# Patient Record
Sex: FEMALE | Race: WHITE | NOT HISPANIC OR LATINO | Employment: UNEMPLOYED | URBAN - METROPOLITAN AREA
[De-identification: names, ages, dates, MRNs, and addresses within clinical notes are randomized per-mention and may not be internally consistent; named-entity substitution may affect disease eponyms.]

---

## 2017-07-10 NOTE — PROGRESS NOTES
HPI     NEXPLANON INSERTION PROCEDURE    Erma Gregory is a 36 year old  who presents for Nexplanon insertion. Patient's last menstrual period was 2017 (exact date).  The patient is currently using nexplanon  for contraception.     Tests:     Discussed risks of bleeding and infection with placement and the insertion procedure. Also discussed the possibility of irregular bleeding for 3-6 months and then often cessation of menses but possibility of continued abnormal bleeding. Small risk of migration of the Nexplanon or difficulty removing the Nexplanon. We also discussed possible side effects of weight gain, skin or hair changes, alterations in mood and increase in headaches.  Lasts for 3 years at which time she could have this one removed and another replaced. All questions answered and consent form signed.   Preprocedure medications: 1% plain lidocaine, 1-2 ml  Nexplanon Lot # [id number]      Exp date:3/18   NDC 0639-9433-65    All equipment required was ready and available.  Patients allergies were confirmed.  The patient was placed in the supine position with her left (non-dominant) arm flexed at the elbow, externally rotated, and placed with her wrist parallel to her ear.  The removal site was identified 6-8 cm above the elbow crease at the inner aspect overlying the bicepital groove.  The removal and reinsertion site was marked with a sterile marker. The direction of insertion was also indicated with a regla 6-8 cm proximal in the bicepital groove.  The  area was cleaned with betadine swabs and anesthetized with 3 cc of 1% lidocaine without epinephrine.  A small skin incision made using a #11 blade.  The Nexplanon was gently manipulated until the tip was at the incision. The becki tip was grasped with a  Joan clamp and was gently removed from the incision.  Both becki tips were inspected following removal and found to be completely intact.    The Nexplanon was removed from its blister.  The  needle shield was removed.  Counter-traction was applied to the skin at the marked needle insertion site.  The tip of the needle was inserted at the site, beveled side up, at a slight angle.  The applicator was then lowered to a horizontal position.  The needle was inserted to its full length, keeping the needle parallel to the surface of the skin and the skin tented.   The cannula was retracted against the obturator.  The 4 cm becki was palpated under the skin.  The patient also palpated the becki.  A pressure bandage was applied with sterile gauze. The patient was instructed to remove the bangage in several hours and replace with a band-aid.      The user card was filled out and given to the patient to keep.    PLAN:   The patient was asked to contact the clinic for any fever/chills/severe pelvic or abdominal pain or heavy bleeding.     FOLLOW-UP:  She was asked to follow up for any problems.   Return to clinic for pap

## 2017-07-11 ENCOUNTER — OFFICE VISIT (OUTPATIENT)
Dept: OBGYN | Facility: OTHER | Age: 37
End: 2017-07-11
Payer: COMMERCIAL

## 2017-07-11 VITALS
HEART RATE: 64 BPM | SYSTOLIC BLOOD PRESSURE: 104 MMHG | WEIGHT: 134.7 LBS | BODY MASS INDEX: 23 KG/M2 | RESPIRATION RATE: 16 BRPM | TEMPERATURE: 98.6 F | HEIGHT: 64 IN | DIASTOLIC BLOOD PRESSURE: 58 MMHG

## 2017-07-11 DIAGNOSIS — Z30.017 NEXPLANON INSERTION: Primary | ICD-10-CM

## 2017-07-11 DIAGNOSIS — Z30.46 NEXPLANON REMOVAL: ICD-10-CM

## 2017-07-11 PROCEDURE — 11983 REMOVE/INSERT DRUG IMPLANT: CPT | Performed by: ADVANCED PRACTICE MIDWIFE

## 2017-07-11 ASSESSMENT — PAIN SCALES - GENERAL: PAINLEVEL: NO PAIN (0)

## 2017-07-11 NOTE — MR AVS SNAPSHOT
"              After Visit Summary   2017    Erma Gregory    MRN: [id number]           Patient Information     Date Of Birth          1980        Visit Information        Provider Department      2017 3:00 PM Lashonda Rico APRN CNM [facility]        Today's Diagnoses     Nexplanon insertion    -  1    Nexplanon removal           Follow-ups after your visit        Who to contact     If you have questions or need follow up information about today's clinic visit or your schedule please contact [facility] directly at [phone].  Normal or non-critical lab and imaging results will be communicated to you by kajeethart, letter or phone within 4 business days after the clinic has received the results. If you do not hear from us within 7 days, please contact the clinic through kajeethart or phone. If you have a critical or abnormal lab result, we will notify you by phone as soon as possible.  Submit refill requests through Scope 5 or call your pharmacy and they will forward the refill request to us. Please allow 3 business days for your refill to be completed.          Additional Information About Your Visit        MyChart Information     Scope 5 lets you send messages to your doctor, view your test results, renew your prescriptions, schedule appointments and more. To sign up, go to [email or web address] . Click on \"Log in\" on the left side of the screen, which will take you to the Welcome page. Then click on \"Sign up Now\" on the right side of the page.     You will be asked to enter the access code listed below, as well as some personal information. Please follow the directions to create your username and password.     Your access code is: [email or web address]  Expires: 10/9/2017  3:38 PM     Your access code will  in 90 days. If you need help or a new code, please call your [facility] or [phone].        Care EveryWhere ID     This is your Care EveryWhere ID. This " "could be used by other organizations to access your [place] medical records  [id number]        Your Vitals Were     Pulse Temperature Respirations Height Last Period BMI (Body Mass Index)    64 98.6  F (37  C) (Oral) 16 5' 3.88\" (1.623 m) 07/08/2017 (Exact Date) 23.21 kg/m2       Blood Pressure from Last 3 Encounters:   07/11/17 104/58   10/27/16 110/62   08/03/15 104/70    Weight from Last 3 Encounters:   07/11/17 134 lb 11.2 oz (61.1 kg)   10/27/16 151 lb (68.5 kg)   08/03/15 158 lb 11.2 oz (72 kg)              We Performed the Following     ETONOGESTREL IMPLANT SYSTEM     REMOVAL AND REINSERTION NEXPLANON          Today's Medication Changes          These changes are accurate as of: 7/11/17  3:38 PM.  If you have any questions, ask your nurse or doctor.               Start taking these medicines.        Dose/Directions    etonogestrel 68 MG Impl   Commonly known as:  IMPLANON/NEXPLANON   Used for:  Nexplanon insertion, Nexplanon removal   Started by:  Lashonda Rico, FLORES CNM        Dose:  1 each   1 each (68 mg) by Subdermal route once for 1 dose   Quantity:  1 each   Refills:  0            Where to get your medicines      Some of these will need a paper prescription and others can be bought over the counter.  Ask your nurse if you have questions.     You don't need a prescription for these medications     etonogestrel 68 MG Impl                Primary Care Provider Office Phone # Fax #    Penny Ana Riddle [phone]       [facility]  [address] [address]        Equal Access to Services     ROBERTO DE JESUS : Beto Conner, wacathyda luqzuleyma, qaybta kaalwen lopes. So [facility] [phone].    ATENCIÓN: Si habla español, tiene a perdomo disposición servicios gratuitos de asistencia lingüística. Llame al [phone].    We comply with applicable federal civil rights laws and [place] laws. We do not discriminate " on the basis of race, color, national origin, age, disability sex, sexual orientation or gender identity.            Thank you!     Thank you for choosing [facility]  for your care. Our goal is always to provide you with excellent care. Hearing back from our patients is one way we can continue to improve our services. Please take a few minutes to complete the written survey that you may receive in the mail after your visit with us. Thank you!             Your Updated Medication List - Protect others around you: Learn how to safely use, store and throw away your medicines at www.disposemymeds.org.          This list is accurate as of: 7/11/17  3:38 PM.  Always use your most recent med list.                   Brand Name Dispense Instructions for use Diagnosis    etonogestrel 68 MG Impl    IMPLANON/NEXPLANON    1 each    1 each (68 mg) by Subdermal route once for 1 dose    Nexplanon insertion, Nexplanon removal       SUBOXONE 12-3 MG Film   Generic drug:  Buprenorphine HCl-Naloxone HCl

## 2017-07-11 NOTE — NURSING NOTE
"Chief Complaint   Patient presents with     Contraception     Panel Management     pap, tobacco cessation, DAP, PHQ9/KAELYN       Initial /58 (BP Location: Right arm, Patient Position: Chair, Cuff Size: Adult Regular)  Pulse 64  Temp 98.6  F (37  C) (Oral)  Resp 16  Ht 5' 3.88\" (1.623 m)  Wt 134 lb 11.2 oz (61.1 kg)  LMP 07/08/2017 (Exact Date)  BMI 23.21 kg/m2 Estimated body mass index is 23.21 kg/(m^2) as calculated from the following:    Height as of this encounter: 5' 3.88\" (1.623 m).    Weight as of this encounter: 134 lb 11.2 oz (61.1 kg).  Medication Reconciliation: complete    "

## 2017-07-11 NOTE — LETTER
My Depression Action Plan  Name: Erma Gregory   Date of Birth 1980  Date: 7/10/2017    My doctor: Penny Riddle   My clinic: [address]  [facility] [id number] [phone]          GREEN    ZONE   Good Control    What it looks like:     Things are going generally well. You have normal up s and down s. You may even feel depressed from time to time, but bad moods usually last less than a day.   What you need to do:  1. Continue to care for yourself (see self care plan)  2. Check your depression survival kit and update it as needed  3. Follow your physician s recommendations including any medication.  4. Do not stop taking medication unless you consult with your physician first.           YELLOW         ZONE Getting Worse    What it looks like:     Depression is starting to interfere with your life.     It may be hard to get out of bed; you may be starting to isolate yourself from others.    Symptoms of depression are starting to last most all day and this has happened for several days.     You may have suicidal thoughts but they are not constant.   What you need to do:     1. Call your care team, your response to treatment will improve if you keep your care team informed of your progress. Yellow periods are signs an adjustment may need to be made.     2. Continue your self-care, even if you have to fake it!    3. Talk to someone in your support network    4. Open up your depression survival kit           RED    ZONE Medical Alert - Get Help    What it looks like:     Depression is seriously interfering with your life.     You may experience these or other symptoms: You can t get out of bed most days, can t work or engage in other necessary activities, you have trouble taking care of basic hygiene, or basic responsibilities, thoughts of suicide or death that will not go away, self-injurious behavior.     What you need to do:  1. Call your care team and  request a same-day appointment. If they are not available (weekends or after hours) call your local crisis line, emergency room or 911.      Electronically signed by: Bárbara Rodriguez, July 10, 2017    Depression Self Care Plan / Survival Kit    Self-Care for Depression  Here s the deal. Your body and mind are really not as separate as most people think.  What you do and think affects how you feel and how you feel influences what you do and think. This means if you do things that people who feel good do, it will help you feel better.  Sometimes this is all it takes.  There is also a place for medication and therapy depending on how severe your depression is, so be sure to consult with your medical provider and/ or Behavioral Health Consultant if your symptoms are worsening or not improving.     In order to better manage my stress, I will:    Exercise  Get some form of exercise, every day. This will help reduce pain and release endorphins, the  feel good  chemicals in your brain. This is almost as good as taking antidepressants!  This is not the same as joining a gym and then never going! (they count on that by the way ) It can be as simple as just going for a walk or doing some gardening, anything that will get you moving.      Hygiene   Maintain good hygiene (Get out of bed in the morning, Make your bed, Brush your teeth, Take a shower, and Get dressed like you were going to work, even if you are unemployed).  If your clothes don't fit try to get ones that do.    Diet  I will strive to eat foods that are good for me, drink plenty of water, and avoid excessive sugar, caffeine, alcohol, and other mood-altering substances.  Some foods that are helpful in depression are: complex carbohydrates, B vitamins, flaxseed, fish or fish oil, fresh fruits and vegetables.    Psychotherapy  I agree to participate in Individual Therapy (if recommended).    Medication  If prescribed medications, I agree to take them.  Missing doses  can result in serious side effects.  I understand that drinking alcohol, or other illicit drug use, may cause potential side effects.  I will not stop my medication abruptly without first discussing it with my provider.    Staying Connected With Others  I will stay in touch with my friends, family members, and my primary care provider/team.    Use your imagination  Be creative.  We all have a creative side; it doesn t matter if it s oil painting, sand castles, or mud pies! This will also kick up the endorphins.    Witness Beauty  (AKA stop and smell the roses) Take a look outside, even in mid-winter. Notice colors, textures. Watch the squirrels and birds.     Service to others  Be of service to others.  There is always someone else in need.  By helping others we can  get out of ourselves  and remember the really important things.  This also provides opportunities for practicing all the other parts of the program.    Humor  Laugh and be silly!  Adjust your TV habits for less news and crime-drama and more comedy.    Control your stress  Try breathing deep, massage therapy, biofeedback, and meditation. Find time to relax each day.     My support system    Clinic Contact:  Phone number:    Contact 1:  Phone number:    Contact 2:  Phone number:    M[employer]onism/:  Phone number:    Therapist:  Phone number:    Local crisis center:    Phone number:    Other community support:  Phone number:

## 2017-12-15 ENCOUNTER — TELEPHONE (OUTPATIENT)
Dept: OBGYN | Facility: OTHER | Age: 37
End: 2017-12-15

## 2017-12-15 NOTE — TELEPHONE ENCOUNTER
Panel Management Review    Date of last visit with a [place] provider: Lashonda Jimenez APRN MEGHANM on 7/11/2017.  Date of next visit with a [place] provider: None.    Health Maintenance List    Health Maintenance   Topic Date Due     DEPRESSION ACTION PLAN Q1 YR  08/03/2016     TOBACCO CESSATION COUNSELING Q1 YR  01/21/2017     PAP SCREENING Q3 YR (SYSTEM ASSIGNED)  02/05/2017     PHQ-9 Q6 MONTHS  04/27/2017     INFLUENZA VACCINE (SYSTEM ASSIGNED)  09/01/2017     TETANUS IMMUNIZATION (SYSTEM ASSIGNED)  02/05/2024       Composite cancer screening  Chart review shows that this patient is due/due soon for the following Pap Smear  Lab Results   Component Value Date    PAP NIL 02/05/2014     No past surgical history on file.    Is hysterectomy listed in surgical history? No   Is mastectomy listed in surgical history? No     Summary:    Patient is due/failing the following:   Pap Smear and Physical    Action needed: Patient needs office visit for Physical with Pap smear.    Type of outreach:  Phone, left message for patient to call back.       Staff Signature:  Penelope Mendoza CMA

## 2018-03-03 ENCOUNTER — TELEPHONE (OUTPATIENT)
Dept: FAMILY MEDICINE | Facility: OTHER | Age: 38
End: 2018-03-03

## 2019-03-05 ENCOUNTER — COMMUNICATION - HEALTHEAST (OUTPATIENT)
Dept: SCHEDULING | Facility: CLINIC | Age: 39
End: 2019-03-05

## 2019-05-17 ENCOUNTER — HOSPITAL ENCOUNTER (INPATIENT)
Facility: CLINIC | Age: 39
LOS: 11 days | Discharge: HOME | DRG: 885 | End: 2019-05-28
Attending: EMERGENCY MEDICINE | Admitting: PSYCHIATRY & NEUROLOGY
Payer: COMMERCIAL

## 2019-05-17 DIAGNOSIS — F29 PSYCHOSIS, UNSPECIFIED PSYCHOSIS TYPE (H): ICD-10-CM

## 2019-05-17 DIAGNOSIS — F31.64 SEVERE MIXED BIPOLAR I DISORDER WITH PSYCHOTIC FEATURES (H): ICD-10-CM

## 2019-05-17 PROCEDURE — 99285 EMERGENCY DEPT VISIT HI MDM: CPT | Mod: Z6 | Performed by: EMERGENCY MEDICINE

## 2019-05-17 PROCEDURE — 99285 EMERGENCY DEPT VISIT HI MDM: CPT | Mod: 25 | Performed by: EMERGENCY MEDICINE

## 2019-05-17 PROCEDURE — 90791 PSYCH DIAGNOSTIC EVALUATION: CPT

## 2019-05-17 PROCEDURE — 12400001 ZZH R&B MH UMMC

## 2019-05-17 RX ORDER — ACETAMINOPHEN 325 MG/1
650 TABLET ORAL EVERY 4 HOURS PRN
Status: DISCONTINUED | OUTPATIENT
Start: 2019-05-17 | End: 2019-05-28 | Stop reason: HOSPADM

## 2019-05-17 RX ORDER — OLANZAPINE 10 MG/1
10 TABLET ORAL
Status: DISCONTINUED | OUTPATIENT
Start: 2019-05-17 | End: 2019-05-28 | Stop reason: HOSPADM

## 2019-05-17 RX ORDER — BUPRENORPHINE AND NALOXONE 8; 2 MG/1; MG/1
2 FILM, SOLUBLE BUCCAL; SUBLINGUAL DAILY
Status: DISCONTINUED | OUTPATIENT
Start: 2019-05-18 | End: 2019-05-21

## 2019-05-17 RX ORDER — HYDROXYZINE HYDROCHLORIDE 25 MG/1
25-50 TABLET, FILM COATED ORAL EVERY 4 HOURS PRN
Status: DISCONTINUED | OUTPATIENT
Start: 2019-05-17 | End: 2019-05-20

## 2019-05-17 RX ORDER — BENZTROPINE MESYLATE 0.5 MG/1
0.5 TABLET ORAL 2 TIMES DAILY PRN
Status: DISCONTINUED | OUTPATIENT
Start: 2019-05-17 | End: 2019-05-20

## 2019-05-17 RX ORDER — ALUMINA, MAGNESIA, AND SIMETHICONE 2400; 2400; 240 MG/30ML; MG/30ML; MG/30ML
30 SUSPENSION ORAL EVERY 4 HOURS PRN
Status: DISCONTINUED | OUTPATIENT
Start: 2019-05-17 | End: 2019-05-28 | Stop reason: HOSPADM

## 2019-05-17 RX ORDER — POLYETHYLENE GLYCOL 3350 17 G/17G
17 POWDER, FOR SOLUTION ORAL DAILY PRN
Status: DISCONTINUED | OUTPATIENT
Start: 2019-05-17 | End: 2019-05-28 | Stop reason: HOSPADM

## 2019-05-17 RX ORDER — LANOLIN ALCOHOL/MO/W.PET/CERES
3 CREAM (GRAM) TOPICAL
Status: DISCONTINUED | OUTPATIENT
Start: 2019-05-17 | End: 2019-05-28 | Stop reason: HOSPADM

## 2019-05-17 RX ORDER — OLANZAPINE 10 MG/2ML
10 INJECTION, POWDER, FOR SOLUTION INTRAMUSCULAR
Status: DISCONTINUED | OUTPATIENT
Start: 2019-05-17 | End: 2019-05-28 | Stop reason: HOSPADM

## 2019-05-17 RX ORDER — IBUPROFEN 200 MG
400 TABLET ORAL EVERY 4 HOURS PRN
Status: DISCONTINUED | OUTPATIENT
Start: 2019-05-17 | End: 2019-05-20

## 2019-05-17 RX ORDER — RISPERIDONE 1 MG/1
1 TABLET ORAL DAILY
Status: DISCONTINUED | OUTPATIENT
Start: 2019-05-18 | End: 2019-05-20

## 2019-05-17 RX ORDER — BUPRENORPHINE AND NALOXONE 8; 2 MG/1; MG/1
2.5 FILM, SOLUBLE BUCCAL; SUBLINGUAL DAILY
Status: ON HOLD | COMMUNITY
End: 2019-05-28

## 2019-05-17 ASSESSMENT — MIFFLIN-ST. JEOR: SCORE: 1319.52

## 2019-05-17 ASSESSMENT — ACTIVITIES OF DAILY LIVING (ADL)
ORAL_HYGIENE: PROMPTS
LAUNDRY: UNABLE TO COMPLETE
HYGIENE/GROOMING: PROMPTS
DRESS: PROMPTS

## 2019-05-17 ASSESSMENT — ENCOUNTER SYMPTOMS
SLEEP DISTURBANCE: 1
HALLUCINATIONS: 1
CONFUSION: 1
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
## 2019-05-17 NOTE — ED TRIAGE NOTES
Not feeling good  Family concerned about was wondering in and out of the house not on any medications for awhile she quit taking them
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
## 2019-05-17 NOTE — ED NOTES
Bed: HW01  Expected date:   Expected time:   Means of arrival:   Comments:  [facility] 39 y/o F ZEKE diaz

## 2019-05-17 NOTE — PHARMACY-ADMISSION MEDICATION HISTORY
UPDATE 5/21/19:    Pharmacy contacted patient's pharmacy, [facility] ([phone]), to verify Suboxone dose.   The most recent prescription for Suboxone was written for Suboxone 8-2 mg SL film, place 2.5 films under tongue daily,  dispensed on 5/8/19 for 17 films (7 day supply). Patient reports she has been self tapering the Suboxone and was taking about 1 film daily prior to admission. Updated medication list to reflect current prescription.     Prior to Admission medications    Medication Sig Last Dose Taking? Auth Provider   buprenorphine HCl-naloxone HCl (SUBOXONE) 8-2 MG per film Place 2.5 Film under the tongue daily  5/15/2019 Yes Unknown, Entered By History   etonogestrel (IMPLANON/NEXPLANON) 68 MG IMPL 1 each by Subdermal route once placed 2017 Yes Unknown, Entered By History     Marley Davidson PharmD  [facility]  __________________________________________    Admission medication history for the May 17, 2019 admission has been completed by pharmacy.     Interview sources:  [place], [place]      Reliability of source: good    Medication compliance: variable     Preferred Outpatient Pharmacy: [facility]    Additional medication history information:   [place] :   1) Suboxone 8-2mg film dispensed on 5-8-19 for quantity #17 films (7day supply)   2) Suboxone 8-2mg film dispensed on 4-15-19 for quantity #53 films (21 day supply)   3) Suboxone 8-2mg film dispensed on 4-4-19 for quantity #17 films (7 day supply)     Prior to Admission Medication List:  Prior to Admission medications    Medication Sig Last Dose Taking? Auth Provider   buprenorphine HCl-naloxone HCl (SUBOXONE) 8-2 MG per film Place 2 Film under the tongue daily 5/15/2019 Yes Unknown, Entered By History   etonogestrel (IMPLANON/NEXPLANON) 68 MG IMPL 1 each by Subdermal route once placed 2017 Yes Unknown, Entered By History       Time spent: 15 minutes    Medication history completed  by:   Penny Goodrich, PharmD, BCPP  [facility]  Available daily from 1-9 PM: phone [phone], ascom *[phone], pager [phone]

## 2019-05-18 PROCEDURE — 12400001 ZZH R&B MH UMMC

## 2019-05-18 PROCEDURE — 99223 1ST HOSP IP/OBS HIGH 75: CPT | Mod: AI | Performed by: PSYCHIATRY & NEUROLOGY

## 2019-05-18 PROCEDURE — H2032 ACTIVITY THERAPY, PER 15 MIN: HCPCS

## 2019-05-18 ASSESSMENT — ACTIVITIES OF DAILY LIVING (ADL)
ORAL_HYGIENE: PROMPTS
HYGIENE/GROOMING: PROMPTS
ORAL_HYGIENE: PROMPTS
DRESS: INDEPENDENT
HYGIENE/GROOMING: PROMPTS
LAUNDRY: UNABLE TO COMPLETE
DRESS: INDEPENDENT

## 2019-05-18 NOTE — PLAN OF CARE
38 year old woman admitted voluntarily per w/c from ed where she stayed for 20 hrs prior to inpt admission,mental status quite disorganized unable to complete admission assessment,15 minute safety checks begun,pt withdrawn and stares into space,did not discuss delusional material on admission this pm,please refer to resident note for further detail,pt to be teamed in am,she is resting quietly at this hour
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
## 2019-05-18 NOTE — PROVIDER NOTIFICATION
Two bracelets placed in locker, one silver color, one gold color. Client has yellow metal rings with white stones, soldered together on her ring finger. Client retains possession of this.

## 2019-05-18 NOTE — PROGRESS NOTES
Patient slept 0.5 hours overnight. Patient spent almost the entire overnight sitting up in the lounge. Patient encouraged to lie down and rest  but despite supportive intervention patient was only able to do so for the briefest period of time before returning to the workstation. At one point patient asked where she was supposed to sleep because the voices were telling her not to sleep in her room. Patient refused offer of medication to make her more comfortable. Patient friendly upon approach. Will continue to monitor and assess.

## 2019-05-18 NOTE — PROVIDER NOTIFICATION
Two bracelets placed in locker, one silver color, one gold color. Client retains yellow metal rings with white stones, soldered together.     Pt's  brought more belongings to hospital for pt, 05/18/2019.  The following belongings placed in pt's locker.  - Suitcase  - Clothing  - Shoes  - Slippers  - 2 towels  - portable electric razor  -  with 2 cords  - 2 packs of 8 AAA batteries  - oral hygiene products  - Deoderant  - hair brush  - Hair care products  - Smart phone  - red tote    A               Admission:  I am responsible for any personal items that are not sent to the safe or pharmacy.  [facility] is not responsible for loss, theft or damage of any property in my possession.    Signature:  _________________________________ Date: _______  Time: _____                                              Staff Signature:  ____________________________ Date: ________  Time: _____      2nd Staff person, if patient is unable/unwilling to sign:    Signature: ________________________________ Date: ________  Time: _____     Discharge:  [facility] has returned all of my personal belongings:    Signature: _________________________________ Date: ________  Time: _____                                          Staff Signature:  ____________________________ Date: ________  Time: _____          05/17/19 2112   Valuables   Patient Belongings remains with patient   Patient Belongings Remaining with Patient bracelet;ring   Did you bring any home meds/supplements to the hospital?  No

## 2019-05-18 NOTE — PROGRESS NOTES
Pt was isolative to their room until the afternoon, then visible in the milieu. Pt agreed to a blood draw, but refused when lab attempted to obtain the labs. Pt appeared confused and anxious. When asked why she refused she stated she had labs done not too long ago, and felt that they did not need to be taken. Pt reports anxiety and auditory hallucinations. Pt stated she was admitted because she felt depressed.      05/18/19 1342   Behavioral Health   Hallucinations appears responding   Thinking confused;distractable   Orientation person: oriented;place: oriented   Memory new learning, recall loss   Insight poor   Judgement impaired   Eye Contact at examiner   Affect tense   Mood anxious   Physical Appearance/Attire untidy;disheveled   Hygiene neglected grooming - unclean body, hair, teeth   1. Wish to be Dead No   2. Non-Specific Active Suicidal Thoughts  No   Self Injury other (see comment)  (denies)   Elopement Hallucinations directing behavior   Activity withdrawn;isolative   Speech coherent   Medication Sensitivity no observed side effects;no stated side effects   Psychomotor / Gait steady;balanced   Activities of Daily Living   Hygiene/Grooming prompts   Oral Hygiene prompts   Dress independent   Room Organization independent   Activity   Activity Assistance Provided independent

## 2019-05-18 NOTE — ED NOTES
ED to Behavioral Floor Handoff    SITUATION  Erma Gregory is a 38 year old female who speaks English and lives in a home with a spouse The patient arrived in the ED by private car from home with a complaint of delusions--the pt has been having odd behaviors with delusions and paranoia. The patient's current symptoms started/worsened 2 month(s) ago and during this time the symptoms have increased.   In the ED, pt was diagnosed with   Final diagnoses:   Psychosis, unspecified psychosis type (H)        Initial vitals were: BP: 110/71  Pulse: 110  Temp: 98.2  F (36.8  C)  Resp: 20  SpO2: 96 %   --------  Is the patient diabetic? No   If yes, last blood glucose? --     If yes, was this treated in the ED? --  --------  Is the patient inebriated (ETOH) No or Impaired on other substances? No  MSSA done? N/A  Last MSSA score: --    Were withdrawal symptoms treated? N/A  Does the patient have a seizure history? No. If yes, date of most recent seizure--  --------  Is the patient patient experiencing suicidal ideation? denies current or recent suicidal ideation     Homicidal ideation? denies current or recent homicidal ideation or behaviors.    Self-injurious behavior/urges? denies current or recent self injurious behavior or ideation.  ------  Was pt aggressive in the ED No  Was a code called No  Is the pt now cooperative? Yes  -------  Meds given in ED: Medications - No data to display   Family present during ED course? Yes  Family currently present? No    BACKGROUND  Does the patient have a cognitive impairment or developmental disability? No  Allergies:   Allergies   Allergen Reactions     Phenergan Dm Other (See Comments)     agitation   .   Social demographics are   Social History     Socioeconomic History     Marital status:      Spouse name: None     Number of children: None     Years of education: None     Highest education level: None   Occupational History     None   Social Needs     Financial resource strain:  None     Food insecurity:     Worry: None     Inability: None     Transportation needs:     Medical: None     Non-medical: None   Tobacco Use     Smoking status: Current Every Day Smoker     Packs/day: 0.50     Years: 8.00     Pack years: 4.00     Types: Cigarettes     Smokeless tobacco: Never Used   Substance and Sexual Activity     Alcohol use: Yes     Comment: last a couple weeks ago     Drug use: No     Comment: unsure when last used is in remission from drugs     Sexual activity: Yes     Partners: Male     Birth control/protection: Implant   Lifestyle     Physical activity:     Days per week: None     Minutes per session: None     Stress: None   Relationships     Social connections:     Talks on phone: None     Gets together: None     Attends Yazidi service: None     Active member of club or organization: None     Attends meetings of clubs or organizations: None     Relationship status: None     Intimate partner violence:     Fear of current or ex partner: None     Emotionally abused: None     Physically abused: None     Forced sexual activity: None   Other Topics Concern     Parent/sibling w/ CABG, MI or angioplasty before 65F 55M? No   Social History Narrative     None        ASSESSMENT  Labs results Labs Ordered and Resulted from Time of ED Arrival Up to the Time of Departure from the ED - No data to display   Imaging Studies: No results found for this or any previous visit (from the past 24 hour(s)).   Most recent vital signs /71   Pulse 110   Temp 98.2  F (36.8  C) (Oral)   Resp 20   SpO2 96%    Abnormal labs/tests/findings requiring intervention:---   Pain control: pt had none  Nausea control: pt had none    RECOMMENDATION  Are any infection precautions needed (MRSA, VRE, etc.)? No If yes, what infection? --  ---  Does the patient have mobility issues? independently. If yes, what device does the pt use? ---  ---  Is patient on 72 hour hold or commitment? No If on 72 hour hold, have hold and  rights been given to patient? N/A  Are admitting orders written if after 10 p.m. ?N/A  Tasks needing to be completed:---     Steve Virgen   [phone] [facility]   [phone] [facility]

## 2019-05-18 NOTE — ED NOTES
During shift patient has sat quietly in darkened room and watched all the people in the hallway.  She never got up and never asked for things.  During rounds, we would offer food, water, toiletries for hygiene and even toileting.    She said yes to fluids a couple times and ate small amounts from food trays when she was hungry.  She finally said she had to use the bathroom but was reluctant to walk in front of the people in the hallway.  I brought a BSC in room which she did use.      I attempted to get a urine specimen but she pooped in the specimen container.

## 2019-05-19 PROCEDURE — 25000128 H RX IP 250 OP 636: Performed by: STUDENT IN AN ORGANIZED HEALTH CARE EDUCATION/TRAINING PROGRAM

## 2019-05-19 PROCEDURE — 25000132 ZZH RX MED GY IP 250 OP 250 PS 637: Performed by: STUDENT IN AN ORGANIZED HEALTH CARE EDUCATION/TRAINING PROGRAM

## 2019-05-19 PROCEDURE — 90853 GROUP PSYCHOTHERAPY: CPT

## 2019-05-19 PROCEDURE — 12400001 ZZH R&B MH UMMC

## 2019-05-19 RX ADMIN — OLANZAPINE 10 MG: 10 INJECTION, POWDER, LYOPHILIZED, FOR SOLUTION INTRAMUSCULAR at 08:04

## 2019-05-19 RX ADMIN — RISPERIDONE 1 MG: 1 TABLET ORAL at 09:46

## 2019-05-19 ASSESSMENT — ACTIVITIES OF DAILY LIVING (ADL)
DRESS: PROMPTS
ORAL_HYGIENE: INDEPENDENT
ORAL_HYGIENE: INDEPENDENT
HYGIENE/GROOMING: INDEPENDENT
LAUNDRY: WITH SUPERVISION
HYGIENE/GROOMING: INDEPENDENT
LAUNDRY: UNABLE TO COMPLETE
DRESS: INDEPENDENT
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
## 2019-05-19 NOTE — PROGRESS NOTES
05/18/19 1900   Art Therapy   Type of Intervention structured groups   Response Participates   Hours .5   Treatment Detail    (Art Therapy-)tempera sticks/ music   Problem-psychiatric diagnoses:   history of psychosis, severe mood dysregulation disorder, Bipolar Disorder, MDD, and OUD on MAT with worsening psychotic symptoms including delusions, paranoria, lack of sleep, and disorganization in the context of medication non-adherence.      Goal-cope, express, and regulate through Art Therapy       Outcome- pt attended a partial group. She observed only, She was quietly engaged and seemed to enjoy the social aspect of group.

## 2019-05-19 NOTE — PLAN OF CARE
Pt remains disorganized,stares into space much of the time,refuses all medication,vs wnl,message to dr richmond regarding med refusalneuroleptic and suboxone, and daughter visited,both quite distressed and tearful, would like a meeting with team would like to be involved actively in treatment,expressed wants thorough workup including mri,encouraged to write concerns and call for meeting on Monday,emotional support offered,observe for sx w/d,build trust,15 min checks maintained,defer to team

## 2019-05-19 NOTE — PROVIDER NOTIFICATION
05/19/19 0917   Seclusion or Restraint Order   In Person Face to Face Assessment Conducted Yes-Eval of pt's immediate situation, reaction to intervention, complete review of systems assessment, behavioral assessment & review/assessment of hx, drugs & meds, recent labs, etc, behavioral condition, need to continue/terminate restraint/seclusion   Pt appears comfortable sleeping in seclusion, no apparent injury observed.

## 2019-05-20 PROCEDURE — 25000132 ZZH RX MED GY IP 250 OP 250 PS 637: Performed by: STUDENT IN AN ORGANIZED HEALTH CARE EDUCATION/TRAINING PROGRAM

## 2019-05-20 PROCEDURE — 99232 SBSQ HOSP IP/OBS MODERATE 35: CPT | Mod: GC | Performed by: PSYCHIATRY & NEUROLOGY

## 2019-05-20 PROCEDURE — 12400001 ZZH R&B MH UMMC

## 2019-05-20 PROCEDURE — G0177 OPPS/PHP; TRAIN & EDUC SERV: HCPCS

## 2019-05-20 PROCEDURE — 25000132 ZZH RX MED GY IP 250 OP 250 PS 637: Performed by: PSYCHIATRY & NEUROLOGY

## 2019-05-20 RX ORDER — RISPERIDONE 1 MG/1
2 TABLET ORAL AT BEDTIME
Status: DISCONTINUED | OUTPATIENT
Start: 2019-05-20 | End: 2019-05-23

## 2019-05-20 RX ADMIN — RISPERIDONE 2 MG: 2 TABLET ORAL at 21:39

## 2019-05-20 RX ADMIN — LITHIUM CARBONATE 900 MG: 600 CAPSULE, GELATIN COATED ORAL at 21:40

## 2019-05-20 RX ADMIN — OLANZAPINE 10 MG: 10 TABLET, FILM COATED ORAL at 21:39

## 2019-05-20 ASSESSMENT — ACTIVITIES OF DAILY LIVING (ADL)
HYGIENE/GROOMING: INDEPENDENT
HYGIENE/GROOMING: INDEPENDENT
ORAL_HYGIENE: INDEPENDENT
DRESS: INDEPENDENT
ORAL_HYGIENE: INDEPENDENT
DRESS: INDEPENDENT

## 2019-05-20 NOTE — PROGRESS NOTES
COWS at 1000 was 4.  Pt declined scheduled Suboxone this shift; pt has been declining this on previous days as well.  See psych associate note w/ more info on pt this shift.  Will continue to monitor and support plan of care.

## 2019-05-20 NOTE — PROGRESS NOTES
05/19/19 1353   Group Therapy Session   Group Attendance attended group session   Total Time (minutes) 30   Group Type psychotherapeutic   Group Topic Covered coping skills/lifestyle management   Patient Participation/Contribution cooperative with task   As a group, participants were asked to brainstorm various symptoms experienced, then brainstorm strategies to cope with the symptoms listed.    Erma presented as quiet and withdrawn. She participated with encouragement. Affect blunted, good eye contact when communicating.

## 2019-05-20 NOTE — PROGRESS NOTES
Patient slept 6.5 hours. Did not have any outbursts the whole night. Sat quietly in the lounge upon waking up and just clarified from the staff if they can start watching TV @ 0630. Patient decided to go back to her room  and get some more sleep.

## 2019-05-20 NOTE — PROGRESS NOTES
In periphery of groups and milieu. Isolative to self. Not social with peers. Little verbal interaction, Short reply and requests only. Staring off into space in lounge. Blunted affect. Presents preoccupied, distracted. Ate meals. No shower.       05/20/19 1100   Behavioral Health   Hallucinations appears responding   Thinking distractable;poor concentration;confused   Orientation place: oriented;situation, disoriented   Memory new learning, recall loss   Insight poor   Judgement impaired   Eye Contact staring;into space;at examiner   Affect blunted, flat   Mood mood is calm   Physical Appearance/Attire disheveled   Hygiene neglected grooming - unclean body, hair, teeth   1. Wish to be Dead No   2. Non-Specific Active Suicidal Thoughts  No   Activity isolative;withdrawn   Speech other (see comments);clear  (Short reply and requests only)   Psychomotor / Gait balanced;steady   Psycho Education   Type of Intervention structured groups   Response observes from a distance   Hours 0.5   Treatment Detail Triggers   Activities of Daily Living   Hygiene/Grooming independent   Oral Hygiene independent   Dress independent   Room Organization independent
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
## 2019-05-20 NOTE — PLAN OF CARE
Initially seen by OT on this date. Erma attended an OT discussion group this morning on daily & weekly routines. Quiet, attentive, internally preoccupied. Will be given a written self-assessment upon increased group attendance. More observation needed to complete initial evaluation at this time.

## 2019-05-20 NOTE — PROGRESS NOTES
"    ----------------------------------------------------------------------------------------------------------  [facility]  Psychiatric Progress Note  Hospital Day #3     Subjective   The patient's care was discussed with the treatment team and chart notes were reviewed.     Sleep: 6.5 hrs  Scheduled Medications: Risperidone 1mg  PRNs: Olanzapine 10 mg IM     Patient was interviewed in the conference room. She states that she feeling pretty somber and depressed. She states that she came to the hospital because she was a little bit depressed. She was unable to tell us what events occurred that lead her to come to the hospital. She states that she is feeling safe here. Her children came to visit her on the unit. We informed her that we will be starting her medications and she stated that she doesn't need them.      The risks, benefits, alternatives, and side effects of treatments including no treatment have been discussed and are understood by the patient and other caregivers.    Review of systems:  Complete psychiatric ROS is negative unless otherwise noted above.      Objective   /71 (BP Location: Left arm)   Pulse 81   Temp 98.4  F (36.9  C) (Oral)   Resp 16   Ht 1.651 m (5' 5\")   Wt 64.9 kg (143 lb)   SpO2 94%   BMI 23.80 kg/m    Weight is 143 lbs 0 oz  Body mass index is 23.8 kg/m .  Psychiatric Examination:  Appearance:  awake, alert, dressed in hospital scrubs and appeared as age stated  Muscle Strength and Tone: normal  Gait and Station: Normal  Behavior (Psychomotor):  no evidence of tardive dyskinesia, dystonia, or tics  Eye Contact:  fair  Speech:  Short responses  Mood:  \"feeling somber\"  Affect:  Flat affect, irritable  Attitude:  Very guarded  Thought Process:  Evidence of thought blocking present. Responds to most questions with short, one two word answers  Thought Content:  no evidence of suicidal ideation or homicidal ideation, no auditory hallucinations " present, no visual hallucinations present and appears very paranoid and guarded  Associations:  no loose associations  Insight:  poor  Judgment:  poor  Oriented to:  time, person, and place  Attention Span and Concentration:  fair  Recent and Remote Memory:  limited  Language: Fluent in English with appropriate syntax and vocabulary.  Fund of Knowledge: appropriate    No results found for this or any previous visit (from the past 24 hour(s)).     Assessment & Plan                 Erma Gregory is a 38 year old female with a history of psychosis, severe mood dysregulation disorder, Bipolar Disorder, MDD, and OUD on MAT who presented to the ED on 5/17/19 with worsening psychotic symptoms including delusions, paranoria, lack of sleep, and disorganization in the context of medication non-adherence. The patient's last psychiatric hospitalization was in Feb 2019 at [facility] with a similar presentation. The pt does not currently have a psychiatrist, but was planning to start at [facility]. Family history is notable for depression, bipolar disorder, and CD issues. Current psychosocial stressors include marital conflict with her  and likely financial stressors given the pt stopped working 6 months prior. The pt does report daily cannabis use. The MSE is notable for a guarded female, sitting up in her hospital bed in darkness with intense eye contact, flat affect, evidence of thought blocking and paranoia, despite denial of AH/VH. The pt is mainly reporting depressive symptoms and a hx of past manic symptoms, however her described presentation of psychosis and significant disorganization in the context of lack of sleep and cannabis use raise concern for diagnosis of bipolar disorder, current episode earlene with psychotic features, vs. Substance induced psychosis vs. Unspecified schizophrenia spectrum disorder. Pt had discontinued her PTA lithium and risperidone a month prior to presentation, and PTA risperidone was  re-started for psychosis. PTA suboxone was continued for medication-assisted treatment of OUD. Pt would likely benefit from restarting lithium or another mood stabilizing agent as well. Given medication non-adherence and lack of insight, would consider the use of an ROBERTS in the future. Given that she actively psychotic, patient warrants inpatient psychiatric hospitalization to maintain her safety.    Erma Gregory was admitted to station 22 with attending Anisa Valdez as a voluntary patient and will be treated in the therapeutic milieu with appropriate individual and group therapies. PTA risperidone was continued and titrated up to 2 mg at bedtime to control her psychosis, Lithium was started at 900 mg at bedtime as slandered therapy for bipolar I disorder.                   Erma Gregory continues to meet criteria for ongoing inpatient hospitalization given active psychosis. Disposition pending clinical stabilization, medication optimization, and formulation of safe discharge plan.     # Psychosis - Bipolar disorder, currently manic with psychotic features vs. Substance induced psychosis  - Risperidone 1mg at bedtime  - Increased Risperidone 2mg at bedtime on 5/17/19  - Lithium capsule 900 mg oral at bedtime on 5/17/19    PRN medications  - Hydroxyzine 25-50mg Q4H prns  - Melatonin 3mg at bedtime prn  - IM/PO Olanzapine 10mg Q2H prn aggression/agitation    # Discontinued Medications (& Rationale):  - None    Medical course:   Patient was medically cleared for admission to inpatient unit. PTA medications were resumed. Patient has a history of opioid use disorder. PTA medications were continued. Suboxone 2 film sublingual daily was restarted. COWS protocol was started on 5/20.      # Opioid Use Disorder   - PTA medications continued   - Starts COWS protocol     #Dyslipidemia  - Consider restarting PTA atorvastatin 20 mg at bedtime, unknown if patient is taking    #HCM  - Has an IUD (Nexplanon - placed 2017)    -  Consults: None  - Lab/Imaging:    - Utox, UPT - to be collected   - CBC, CMP - to be collected    Legal Status: Voluntary  Disposition: TBD, pending clinical stabilization, medication optimization, and formulation of a safe discharge plan.   Safety Assessment:   Behavioral Orders   Procedures     Assault precautions     Code 1 - Restrict to Unit     Routine Programming     As clinically indicated     Status 15     Every 15 minutes.      Patient seen and discussed with my attending physician, Dr. Anisa Valdez MD who is in agreement with my assessment and plan.     Angela Franco, MS3     Resident Attestation:  I was present with the medical student who participated in the service and in the documentation of the note. I have verified the history and personally performed the exam and medical decision making. I agree with the assessment and plan of care as documented in the note.     Natalie Min MD  PGY1 Psychiatry Resident     Attestation:  This patient has been seen and evaluated by me, Anisa Willis.  I have discussed this patient with the psychiatry resident and I agree with the findings and plan in this note.    I have reviewed today's vital signs, medications, labs and imaging.   Anisa Willis MD.

## 2019-05-21 LAB
ALBUMIN SERPL-MCNC: 3.7 G/DL (ref 3.4–5)
ALP SERPL-CCNC: 84 U/L (ref 40–150)
ALT SERPL W P-5'-P-CCNC: 17 U/L (ref 0–50)
ANION GAP SERPL CALCULATED.3IONS-SCNC: 10 MMOL/L (ref 3–14)
AST SERPL W P-5'-P-CCNC: 10 U/L (ref 0–45)
BILIRUB SERPL-MCNC: 0.6 MG/DL (ref 0.2–1.3)
BUN SERPL-MCNC: 11 MG/DL (ref 7–30)
CALCIUM SERPL-MCNC: 8.8 MG/DL (ref 8.5–10.1)
CHLORIDE SERPL-SCNC: 111 MMOL/L (ref 94–109)
CO2 SERPL-SCNC: 21 MMOL/L (ref 20–32)
CREAT SERPL-MCNC: 0.76 MG/DL (ref 0.52–1.04)
GFR SERPL CREATININE-BSD FRML MDRD: >90 ML/MIN/{1.73_M2}
GLUCOSE SERPL-MCNC: 128 MG/DL (ref 70–99)
LITHIUM SERPL-SCNC: 0.47 MMOL/L (ref 0.6–1.2)
POTASSIUM SERPL-SCNC: 3.8 MMOL/L (ref 3.4–5.3)
PROT SERPL-MCNC: 7.3 G/DL (ref 6.8–8.8)
SODIUM SERPL-SCNC: 142 MMOL/L (ref 133–144)

## 2019-05-21 PROCEDURE — 99232 SBSQ HOSP IP/OBS MODERATE 35: CPT | Mod: GC | Performed by: PSYCHIATRY & NEUROLOGY

## 2019-05-21 PROCEDURE — G0177 OPPS/PHP; TRAIN & EDUC SERV: HCPCS

## 2019-05-21 PROCEDURE — 36415 COLL VENOUS BLD VENIPUNCTURE: CPT | Performed by: PSYCHIATRY & NEUROLOGY

## 2019-05-21 PROCEDURE — H2032 ACTIVITY THERAPY, PER 15 MIN: HCPCS

## 2019-05-21 PROCEDURE — 25000132 ZZH RX MED GY IP 250 OP 250 PS 637: Performed by: PSYCHIATRY & NEUROLOGY

## 2019-05-21 PROCEDURE — 25000132 ZZH RX MED GY IP 250 OP 250 PS 637: Performed by: STUDENT IN AN ORGANIZED HEALTH CARE EDUCATION/TRAINING PROGRAM

## 2019-05-21 PROCEDURE — 80053 COMPREHEN METABOLIC PANEL: CPT | Performed by: PSYCHIATRY & NEUROLOGY

## 2019-05-21 PROCEDURE — 80178 ASSAY OF LITHIUM: CPT | Performed by: PSYCHIATRY & NEUROLOGY

## 2019-05-21 PROCEDURE — 12400001 ZZH R&B MH UMMC

## 2019-05-21 RX ORDER — BUPRENORPHINE AND NALOXONE 8; 2 MG/1; MG/1
1 FILM, SOLUBLE BUCCAL; SUBLINGUAL DAILY
Status: DISCONTINUED | OUTPATIENT
Start: 2019-05-22 | End: 2019-05-23

## 2019-05-21 RX ADMIN — LITHIUM CARBONATE 900 MG: 600 CAPSULE, GELATIN COATED ORAL at 21:00

## 2019-05-21 RX ADMIN — RISPERIDONE 2 MG: 2 TABLET ORAL at 21:00

## 2019-05-21 ASSESSMENT — ACTIVITIES OF DAILY LIVING (ADL)
ORAL_HYGIENE: INDEPENDENT
DRESS: INDEPENDENT
LAUNDRY: WITH SUPERVISION
DRESS: INDEPENDENT
HYGIENE/GROOMING: INDEPENDENT
ORAL_HYGIENE: INDEPENDENT
HYGIENE/GROOMING: INDEPENDENT

## 2019-05-21 ASSESSMENT — MIFFLIN-ST. JEOR: SCORE: 1321.79

## 2019-05-21 NOTE — PROGRESS NOTES
"Pt was observed present in the milieu throughout the majority of the evening watching TV. Pt was observed to remain in the same chair the entirety of the time in the lounge. While in the lounge pt was observed to stare at staff, other patients, as well as into space. Patient's facial expressions appeared tense and distracted. During check-in with writer pt rated their anxiety a 1 (on a scale of 1-10 with 10 being the worst) but rated their depression a 10. When asked further questions about what is contributing to their high depression rating pt answered with short answers to open ended questions. When asked why they were brought to the hospital pt states \"I don't know\". Pt denies SI and SIB as well as thoughts to hurt others.      05/20/19 2149   Behavioral Health   Hallucinations denies / not responding to hallucinations   Thinking distractable;poor concentration   Orientation person: oriented;place: oriented   Memory new learning, recall loss   Insight poor   Judgement impaired   Eye Contact at examiner;into space   Affect blunted, flat   Mood mood is calm   Physical Appearance/Attire attire appropriate to age and situation   Hygiene   (adequate)   Suicidality   (Pt denies)   1. Wish to be Dead No   2. Non-Specific Active Suicidal Thoughts  No   Self Injury   (Pt denies)   Activity withdrawn   Speech clear;coherent   Medication Sensitivity no stated side effects;no observed side effects   Psychomotor / Gait balanced;steady   Psycho Education   Type of Intervention 1:1 intervention   Response participates with encouragement   Hours 0.5   Treatment Detail Check-in   Activities of Daily Living   Hygiene/Grooming independent   Oral Hygiene independent   Dress independent   Room Organization independent     "
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
## 2019-05-21 NOTE — PROGRESS NOTES
Almost mute the whole shift. Not social with peers. I periphery of milieu and some groups. Blunted. Staring into space much of shift. Ate meals.       05/21/19 1000   Behavioral Health   Hallucinations appears responding;auditory   Thinking poor concentration;distractable;confused   Insight poor   Judgement impaired   Eye Contact into space;at examiner   Affect blunted, flat   Mood mood is calm   Physical Appearance/Attire attire appropriate to age and situation   Hygiene well groomed   Activity other (see comment)  (Freq in milieu staring off into space)   Speech clear;coherent   Psychomotor / Gait balanced;steady   Psycho Education   Type of Intervention structured groups   Response unavailable   Activities of Daily Living   Hygiene/Grooming independent   Oral Hygiene independent   Dress independent   Laundry with supervision   Room Organization independent
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
## 2019-05-22 LAB
BASOPHILS # BLD AUTO: 0 10E9/L (ref 0–0.2)
BASOPHILS NFR BLD AUTO: 0.1 %
DIFFERENTIAL METHOD BLD: NORMAL
EOSINOPHIL # BLD AUTO: 0.1 10E9/L (ref 0–0.7)
EOSINOPHIL NFR BLD AUTO: 0.6 %
ERYTHROCYTE [DISTWIDTH] IN BLOOD BY AUTOMATED COUNT: 12.7 % (ref 10–15)
HCG UR QL: NEGATIVE
HCG UR QL: NEGATIVE
HCT VFR BLD AUTO: 43.7 % (ref 35–47)
HGB BLD-MCNC: 14.9 G/DL (ref 11.7–15.7)
IMM GRANULOCYTES # BLD: 0 10E9/L (ref 0–0.4)
IMM GRANULOCYTES NFR BLD: 0.2 %
INTERNAL QC OK POCT: NO
LITHIUM SERPL-SCNC: 0.5 MMOL/L (ref 0.6–1.2)
LYMPHOCYTES # BLD AUTO: 1.7 10E9/L (ref 0.8–5.3)
LYMPHOCYTES NFR BLD AUTO: 19.5 %
MCH RBC QN AUTO: 30 PG (ref 26.5–33)
MCHC RBC AUTO-ENTMCNC: 34.1 G/DL (ref 31.5–36.5)
MCV RBC AUTO: 88 FL (ref 78–100)
MONOCYTES # BLD AUTO: 0.5 10E9/L (ref 0–1.3)
MONOCYTES NFR BLD AUTO: 5.5 %
NEUTROPHILS # BLD AUTO: 6.4 10E9/L (ref 1.6–8.3)
NEUTROPHILS NFR BLD AUTO: 74.1 %
NRBC # BLD AUTO: 0 10*3/UL
NRBC BLD AUTO-RTO: 0 /100
PLATELET # BLD AUTO: 290 10E9/L (ref 150–450)
RBC # BLD AUTO: 4.96 10E12/L (ref 3.8–5.2)
WBC # BLD AUTO: 8.7 10E9/L (ref 4–11)

## 2019-05-22 PROCEDURE — 12400001 ZZH R&B MH UMMC

## 2019-05-22 PROCEDURE — 81025 URINE PREGNANCY TEST: CPT | Performed by: STUDENT IN AN ORGANIZED HEALTH CARE EDUCATION/TRAINING PROGRAM

## 2019-05-22 PROCEDURE — 36415 COLL VENOUS BLD VENIPUNCTURE: CPT | Performed by: PSYCHIATRY & NEUROLOGY

## 2019-05-22 PROCEDURE — H2032 ACTIVITY THERAPY, PER 15 MIN: HCPCS

## 2019-05-22 PROCEDURE — 80178 ASSAY OF LITHIUM: CPT | Performed by: PSYCHIATRY & NEUROLOGY

## 2019-05-22 PROCEDURE — 99232 SBSQ HOSP IP/OBS MODERATE 35: CPT | Mod: GC | Performed by: PSYCHIATRY & NEUROLOGY

## 2019-05-22 PROCEDURE — 85025 COMPLETE CBC W/AUTO DIFF WBC: CPT | Performed by: PSYCHIATRY & NEUROLOGY

## 2019-05-22 PROCEDURE — 25000132 ZZH RX MED GY IP 250 OP 250 PS 637: Performed by: PSYCHIATRY & NEUROLOGY

## 2019-05-22 RX ADMIN — RISPERIDONE 2 MG: 2 TABLET ORAL at 21:03

## 2019-05-22 RX ADMIN — LITHIUM CARBONATE 900 MG: 600 CAPSULE, GELATIN COATED ORAL at 19:24

## 2019-05-22 NOTE — PROGRESS NOTES
"Participated in Music Therapy group with focus on mood elevation, validation and decreasing anxiety and improved group cohesiveness. Engaged and cooperative in music listening interventions.       Chose the song \"[place]ail Away\" by Vicente Handley.  Flat, blank affect.  Looked straight ahead the whole time and did not interact with peers.  Would answer questions when prompted.  Appeared distant.     "

## 2019-05-22 NOTE — PROGRESS NOTES
Patient remained withdrawn for better part of the evening. Appetite good. Watching tv for activity. Quiet. Offers little information. Mood somber.       05/21/19 2137   Behavioral Health   Hallucinations denies / not responding to hallucinations   Thinking distractable;poor concentration   Orientation person: oriented;place: oriented   Insight poor   Judgement impaired   Eye Contact staring   Affect blunted, flat   Mood mood is calm   Physical Appearance/Attire attire appropriate to age and situation   Hygiene well groomed   1. Wish to be Dead No   Activity withdrawn   Speech clear;coherent  (Poverty of speech.)

## 2019-05-22 NOTE — PROGRESS NOTES
Pt in [facility] watching tv most of shift, attended group but not social with peers. Pt asked 1:1 staff if they need to tell them everything they are going to do before they are going to do it Writer informed pt that they could go about their business, but pt seemed to not trust this and asked writer if they were sure that was okay. Pt continued to inform writer what they were going to do before they did it. No behavioral concerns. Endorses depression. Denies SI/SIB. Sleep and appetite good.      05/21/19 2200   Behavioral Health   Hallucinations denies / not responding to hallucinations   Orientation person: oriented;place: oriented   Insight poor   Judgement impaired   Eye Contact at examiner   Affect blunted, flat   Mood mood is calm   Physical Appearance/Attire attire appropriate to age and situation   Hygiene well groomed   Suicidality other (see comments)  (denies )   1. Wish to be Dead No   2. Non-Specific Active Suicidal Thoughts  No   Self Injury other (see comment)  (denies )   Activity withdrawn   Speech clear;coherent   Medication Sensitivity no stated side effects;no observed side effects   Psychomotor / Gait balanced;steady   Music Therapy   Type of Participation Music therapy group   Response Participates independently   Hours 1   Psycho Education   Type of Intervention 1:1 intervention   Response participates, initiates socially appropriate   Hours 0.5   Treatment Detail check in    Activities of Daily Living   Hygiene/Grooming independent   Oral Hygiene independent   Dress independent   Room Organization independent

## 2019-05-22 NOTE — PROGRESS NOTES
Patient states she feels confused at times. Mostly pleasant and approachable. Appetite good. Attends groups but sits on periphery. Quiet and withdrawn. Remains on 1 to 1 status.         05/22/19 1400   Behavioral Health   Hallucinations denies / not responding to hallucinations   Thinking distractable;confused   Orientation situation, disoriented;person: oriented;place: oriented   Memory baseline memory   Insight   (Limited.)   Judgement impaired   Affect blunted, flat   Mood depressed;hopeless;mood is calm   Physical Appearance/Attire attire appropriate to age and situation;neat   Hygiene   (No request for shower.)   1. Wish to be Dead No   Activity withdrawn   Speech coherent

## 2019-05-22 NOTE — PLAN OF CARE
Problem: OT General Care Plan  Goal: OT Frequency  Pt will complete written self-assessment within three days of admission.     Pt actively participated in about x20 minutes (no charge) of occupational therapy clinic. Pt was able to ask for assistance as needed, and independently initiated a creative expression task. Pt demonstrated fair attention to task. She declined adding detail to her project. No interaction with peers observed. Will continue to assess.

## 2019-05-23 PROCEDURE — 25000132 ZZH RX MED GY IP 250 OP 250 PS 637: Performed by: PSYCHIATRY & NEUROLOGY

## 2019-05-23 PROCEDURE — 12400001 ZZH R&B MH UMMC

## 2019-05-23 PROCEDURE — 99232 SBSQ HOSP IP/OBS MODERATE 35: CPT | Mod: GC | Performed by: PSYCHIATRY & NEUROLOGY

## 2019-05-23 PROCEDURE — 25000132 ZZH RX MED GY IP 250 OP 250 PS 637

## 2019-05-23 RX ADMIN — RISPERIDONE 3 MG: 2 TABLET ORAL at 21:11

## 2019-05-23 RX ADMIN — LITHIUM CARBONATE 900 MG: 600 CAPSULE, GELATIN COATED ORAL at 21:11

## 2019-05-23 ASSESSMENT — ACTIVITIES OF DAILY LIVING (ADL)
HYGIENE/GROOMING: INDEPENDENT
ORAL_HYGIENE: INDEPENDENT
DRESS: INDEPENDENT;SCRUBS (BEHAVIORAL HEALTH)
HYGIENE/GROOMING: INDEPENDENT
LAUNDRY: WITH SUPERVISION
DRESS: INDEPENDENT
LAUNDRY: WITH SUPERVISION
ORAL_HYGIENE: INDEPENDENT
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
## 2019-05-23 NOTE — PLAN OF CARE
Pt remains on sio , very quiet , withdrawn , denies si . Some confusion , states she thought staff told her to take food off other pts tray . She has mainly been lying awake in bed , out for meals . Opiate withdrawal score of one , Pt informed and agreeable with transfer next door to station 20 . Report given to RN

## 2019-05-23 NOTE — PROGRESS NOTES
05/23/19 1318   Behavioral Health   Hallucinations denies / not responding to hallucinations   Thinking confused   Orientation person: oriented;place: oriented   Memory other (see comment)  (ua)   Insight poor   Judgement impaired   Eye Contact at examiner   Affect blunted, flat   Mood mood is calm   Physical Appearance/Attire disheveled;appears stated age;attire appropriate to age and situation   Hygiene neglected grooming - unclean body, hair, teeth   Suicidality other (see comments)  (None shared or observed today)   Self Injury other (see comment)  (None shared or observed today )   Activity withdrawn;other (see comment)  (sleeping/napping )   Speech clear;coherent   Medication Sensitivity no stated side effects;no observed side effects   Psychomotor / Gait steady;balanced     Pt. Shared waiting to speak with doctor more about mental health crisis. Pt. Was sleeping/napping most of the day. Pt. Became more oriented to the unit today with staff. Pt. Presents slightly confused upon check in.

## 2019-05-23 NOTE — PROGRESS NOTES
Pt has order for bladder scans.  This morning pt informed of this order and told to tell this staff when she has urinated.  At 1230 pt had said anything to this staff about urinating.  Pt had been laying in bed all day.  Bladder scan redone after lunch after pt up for little while walking. Asked pt to try to urinate. Pt said she could not Bladder scan 949cc.,  Md ordered 1x straight cath and internal med consult or urinary retention.  RN  arrived on unit at which point pt reported he had urinated.  Bladder scan redone 0 ml residual.

## 2019-05-23 NOTE — PROGRESS NOTES
Brief Medicine Note    Contacted by nursing regarding urinary retention. Patient has been holding her urine and having paranoid thoughts regarding urination. Today bedside RN completed a bladder scan with 848 mL of urine this AM. Allowed a few hours for patient to urinate. Patient did not urinate with repeat scan at 949 mL this afternoon. RN came to straight cath patient, at which point patient reported she had urinated. Repeat bladder scan then showed 0 mL residual.     UA with UC reflex ordered, please contact Medicine if this is abnormal.     No indication for formal Medicine consult at this time. Please page if new questions or concerns arise.     Yeimy Roca PA-C  Hospitalist Service  Pager: [phone]

## 2019-05-23 NOTE — PROGRESS NOTES
05/22/19 1130   Dance Movement Therapy   Type of Intervention structured groups   Response participates with encouragement   Hours 0.5
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
## 2019-05-24 PROCEDURE — 25000132 ZZH RX MED GY IP 250 OP 250 PS 637

## 2019-05-24 PROCEDURE — 12400001 ZZH R&B MH UMMC

## 2019-05-24 PROCEDURE — 99232 SBSQ HOSP IP/OBS MODERATE 35: CPT | Mod: GC | Performed by: PSYCHIATRY & NEUROLOGY

## 2019-05-24 PROCEDURE — 25000132 ZZH RX MED GY IP 250 OP 250 PS 637: Performed by: PSYCHIATRY & NEUROLOGY

## 2019-05-24 RX ADMIN — LITHIUM CARBONATE 900 MG: 600 CAPSULE, GELATIN COATED ORAL at 22:06

## 2019-05-24 RX ADMIN — RISPERIDONE 3 MG: 2 TABLET ORAL at 22:06

## 2019-05-24 ASSESSMENT — ACTIVITIES OF DAILY LIVING (ADL)
DRESS: SCRUBS (BEHAVIORAL HEALTH)
DRESS: SCRUBS (BEHAVIORAL HEALTH)
ORAL_HYGIENE: INDEPENDENT
LAUNDRY: WITH SUPERVISION
HYGIENE/GROOMING: INDEPENDENT
ORAL_HYGIENE: INDEPENDENT
LAUNDRY: UNABLE TO COMPLETE
HYGIENE/GROOMING: INDEPENDENT

## 2019-05-24 NOTE — PROGRESS NOTES
Pt spent the entire time in her room lying in bed. She wants to be transferred to another unit. She comes out for meals and back to her room. She denies suicidal ideations or auditory hallucinations. She states that he does not have great appetite.     05/24/19 1427   Behavioral Health   Hallucinations appears responding   Thinking distractable   Orientation place: oriented;date: oriented;time: oriented;person: oriented   Memory baseline memory   Insight poor   Judgement impaired   Eye Contact at examiner   Affect blunted, flat   Mood depressed;mood is calm   Physical Appearance/Attire appears stated age   Hygiene well groomed   Suicidality other (see comments)  (Denies)   1. Wish to be Dead No   2. Non-Specific Active Suicidal Thoughts  No   Speech coherent;clear   Medication Sensitivity no stated side effects   Psycho Education   Type of Intervention 1:1 intervention   Response participates, initiates socially appropriate   Hours 0.5   Activities of Daily Living   Hygiene/Grooming independent   Oral Hygiene independent   Dress scrubs (behavioral health)   Laundry with supervision   Room Organization independent   Activity   Activity Assistance Provided independent

## 2019-05-24 NOTE — PLAN OF CARE
Problem: OT General Care Plan  Goal: OT Frequency  Description  Pt will complete self-assessment within 3 days of initial attendance to OT group.     Attended 1/3 occupational therapy groups offered this date. Overall content affect and full engagement.    Pt participated in a collaborative multi-step hands-on meal preparation group. 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   Pt Response: Demonstrated good process, performance, and collaborative social interaction skills, specifically attention to detail and working well with others.      OT staff will meet with pt to review the role of occupational therapy and explain the value of having them involved in their treatment plan including options to meet current needs/self-identified goals. As group attendance is established, Pt will be given self-assessment to inform OT initial assessment.    Outcome: Improving
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
## 2019-05-24 NOTE — PROGRESS NOTES
Pt denies SI/SIB. Pt spent most of the evening sleeping. Pt refused further check-in with this writer. Pt came out for meals and medications.       05/23/19 2212   Behavioral Health   Hallucinations other (see comment)  (MARIKA)   Thinking confused   Orientation person: oriented;place: oriented   Memory other (see comment)  (MARIKA)   Insight poor   Judgement impaired   Eye Contact at examiner   Affect blunted, flat   Mood mood is calm   Physical Appearance/Attire disheveled   Hygiene neglected grooming - unclean body, hair, teeth   Suicidality other (see comments)  (denies)   1. Wish to be Dead No   2. Non-Specific Active Suicidal Thoughts  No   Self Injury other (see comment)  (denies)   Elopement Hallucinations directing behavior   Activity withdrawn;isolative   Speech clear;coherent   Medication Sensitivity no stated side effects   Psychomotor / Gait balanced;steady   Activities of Daily Living   Hygiene/Grooming independent   Oral Hygiene independent   Dress independent   Laundry with supervision   Room Organization independent

## 2019-05-25 LAB
ALBUMIN UR-MCNC: NEGATIVE MG/DL
APPEARANCE UR: CLEAR
BACTERIA #/AREA URNS HPF: ABNORMAL /HPF
BILIRUB UR QL STRIP: NEGATIVE
COLOR UR AUTO: YELLOW
GLUCOSE UR STRIP-MCNC: NEGATIVE MG/DL
HGB UR QL STRIP: NEGATIVE
KETONES UR STRIP-MCNC: NEGATIVE MG/DL
LEUKOCYTE ESTERASE UR QL STRIP: NEGATIVE
LITHIUM SERPL-SCNC: 0.79 MMOL/L (ref 0.6–1.2)
MUCOUS THREADS #/AREA URNS LPF: PRESENT /LPF
NITRATE UR QL: NEGATIVE
PH UR STRIP: 5.5 PH (ref 5–7)
RBC #/AREA URNS AUTO: 1 /HPF (ref 0–2)
SOURCE: ABNORMAL
SP GR UR STRIP: 1.01 (ref 1–1.03)
SQUAMOUS #/AREA URNS AUTO: 1 /HPF (ref 0–1)
UROBILINOGEN UR STRIP-MCNC: NORMAL MG/DL (ref 0–2)
WBC #/AREA URNS AUTO: 2 /HPF (ref 0–5)

## 2019-05-25 PROCEDURE — 25000132 ZZH RX MED GY IP 250 OP 250 PS 637

## 2019-05-25 PROCEDURE — 12400001 ZZH R&B MH UMMC

## 2019-05-25 PROCEDURE — 25000132 ZZH RX MED GY IP 250 OP 250 PS 637: Performed by: PSYCHIATRY & NEUROLOGY

## 2019-05-25 PROCEDURE — 36415 COLL VENOUS BLD VENIPUNCTURE: CPT

## 2019-05-25 PROCEDURE — 81001 URINALYSIS AUTO W/SCOPE: CPT | Performed by: PHYSICIAN ASSISTANT

## 2019-05-25 PROCEDURE — 80178 ASSAY OF LITHIUM: CPT

## 2019-05-25 RX ADMIN — LITHIUM CARBONATE 900 MG: 600 CAPSULE, GELATIN COATED ORAL at 21:24

## 2019-05-25 RX ADMIN — RISPERIDONE 3 MG: 2 TABLET ORAL at 21:24

## 2019-05-25 ASSESSMENT — ACTIVITIES OF DAILY LIVING (ADL)
HYGIENE/GROOMING: INDEPENDENT
ORAL_HYGIENE: INDEPENDENT
DRESS: INDEPENDENT

## 2019-05-25 NOTE — PROGRESS NOTES
Patient isolated although came out and watched some TV with others. She visited with her  for one hour. Then she stayed the remainder of her time in her room awake laying on her bed. She said she is feeling safe. Her visit from her  was the highlight of her evening.

## 2019-05-25 NOTE — PLAN OF CARE
48 hour assessment- pt denies SI. Denies any mental health issues, denies feeling paranoid.  Pt does answer questions but does not give nay information further other than short answer to your question.  With exception of meals and her  visiting pt has spent the shift in her room sitting on her bed.  Pt did not attend community meeting, has not interacted with other patients.  Pt states she is urination w/o issues and denies any opiate w/d s/x's.  Pt states unsure if she has a job. 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  But has not been in contact with her supervisor.

## 2019-05-25 NOTE — PROGRESS NOTES
Pt s mood is calm and has neutral affect. She was social with others but mostly stays in her room. Her  was here to visit. The visit went well. She denies having suicidal ideation or self-harm thoughts. She denies having auditory or visual hallucination. She told this writer that her mood has improved.

## 2019-05-26 PROCEDURE — 25000132 ZZH RX MED GY IP 250 OP 250 PS 637: Performed by: PSYCHIATRY & NEUROLOGY

## 2019-05-26 PROCEDURE — 25000132 ZZH RX MED GY IP 250 OP 250 PS 637: Performed by: STUDENT IN AN ORGANIZED HEALTH CARE EDUCATION/TRAINING PROGRAM

## 2019-05-26 PROCEDURE — 25000132 ZZH RX MED GY IP 250 OP 250 PS 637

## 2019-05-26 PROCEDURE — 12400001 ZZH R&B MH UMMC

## 2019-05-26 PROCEDURE — H2032 ACTIVITY THERAPY, PER 15 MIN: HCPCS

## 2019-05-26 RX ADMIN — MELATONIN TAB 3 MG 3 MG: 3 TAB at 21:47

## 2019-05-26 RX ADMIN — LITHIUM CARBONATE 900 MG: 600 CAPSULE, GELATIN COATED ORAL at 20:57

## 2019-05-26 RX ADMIN — RISPERIDONE 3 MG: 2 TABLET ORAL at 20:57

## 2019-05-26 ASSESSMENT — ACTIVITIES OF DAILY LIVING (ADL)
ORAL_HYGIENE: INDEPENDENT
LAUNDRY: WITH SUPERVISION
HYGIENE/GROOMING: INDEPENDENT
DRESS: INDEPENDENT

## 2019-05-26 ASSESSMENT — MIFFLIN-ST. JEOR: SCORE: 1328.6

## 2019-05-26 NOTE — PROGRESS NOTES
Pt s mood is calm and has neutral affect. She is in her room after dinner. Urine specimen is sent to lab. Pt denies having suicidal ideation or self-harm thoughts. She denies having auditory or visual hallucination. Pt tells this writer that her mood has improved and feels better. Her COWS is 2. She has no withdrawal symptoms.

## 2019-05-27 PROCEDURE — 25000132 ZZH RX MED GY IP 250 OP 250 PS 637: Performed by: STUDENT IN AN ORGANIZED HEALTH CARE EDUCATION/TRAINING PROGRAM

## 2019-05-27 PROCEDURE — 25000132 ZZH RX MED GY IP 250 OP 250 PS 637: Performed by: PSYCHIATRY & NEUROLOGY

## 2019-05-27 PROCEDURE — G0177 OPPS/PHP; TRAIN & EDUC SERV: HCPCS

## 2019-05-27 PROCEDURE — 25000132 ZZH RX MED GY IP 250 OP 250 PS 637

## 2019-05-27 PROCEDURE — 12400001 ZZH R&B MH UMMC

## 2019-05-27 RX ADMIN — LITHIUM CARBONATE 900 MG: 600 CAPSULE, GELATIN COATED ORAL at 20:59

## 2019-05-27 RX ADMIN — ACETAMINOPHEN 650 MG: 325 TABLET, FILM COATED ORAL at 06:51

## 2019-05-27 RX ADMIN — MELATONIN TAB 3 MG 3 MG: 3 TAB at 22:10

## 2019-05-27 RX ADMIN — RISPERIDONE 3 MG: 2 TABLET ORAL at 20:59

## 2019-05-27 ASSESSMENT — ACTIVITIES OF DAILY LIVING (ADL)
HYGIENE/GROOMING: HANDWASHING;INDEPENDENT
ORAL_HYGIENE: INDEPENDENT
DRESS: STREET CLOTHES;INDEPENDENT

## 2019-05-27 NOTE — PROGRESS NOTES
Pt s mood is calm and has full range of affect. She is social with others. She denies having suicidal ideation or self-harm thoughts. She denies having auditory or visual hallucination. She is pleasant when approached. Her COWS is 1. No withdrawal symptoms. Good appetite

## 2019-05-27 NOTE — PLAN OF CARE
"Pt is pleasant and calm; states her mood is \"content, but ivjaya to go home.\" She is attending all grps, and is social w peers. She denies si and all other MH sx. Her goal for the day is \"to stay focused and attend groups.\"   "

## 2019-05-27 NOTE — PROGRESS NOTES
Pt awake at 0555, reporting difficulty falling back asleep. COWS assessment completed. Pt scored a 4 at 0555. Largely asymptomatic. Slight tremor observed and some nasal stuffiness. It should be noted that nasal stuffiness may be attributable to development of upper respiratory infection vs opiate withdrawal.   Recommend unit staff inquire to MD about discontinuation of opiate withdrawal scale as pt has not been scoring to merit intervention.   Recommend monitoring for development of URI. Recommend oncoming staff follow-up with MD for clarification of straight cath and bladder scan orders (no parameters/specifications in the orders). Pt is reportedly urinating per self without issue.   This information will be provided by writer in report.     Resident called unit. Writer informed MD of pt's COWS scores. Opiate withdrawal scale d/c'd per Resident order. Resident notified of pt's c/o upper respiratory congestion.   Bladder scan order and straight cath order d/c'd by Resident as well. Pt reports she is urinating per self.   Unit RN aware of this information.

## 2019-05-27 NOTE — PLAN OF CARE
Problem: OT General Care Plan  Goal: OT Frequency  Description  Pt will complete self-assessment within 3 days of initial attendance to OT group.      Attended 1/2 occupational therapy groups offered this date. Overall congruent affect and full engagement.    Occupational therapy clinic to complete 2 independent projects. Pt Response: Demonstrated consistent performance skills as observed on previous dates.     Outcome: Improving

## 2019-05-27 NOTE — PROGRESS NOTES
Participated in Music Therapy group with focus on mood elevation, validation and decreasing anxiety and improved group cohesiveness. Engaged and cooperative in music listening interventions.   Showed progress in session goals.  Pleasant and calm.

## 2019-05-28 VITALS
DIASTOLIC BLOOD PRESSURE: 64 MMHG | WEIGHT: 148 LBS | BODY MASS INDEX: 24.66 KG/M2 | HEIGHT: 65 IN | OXYGEN SATURATION: 97 % | RESPIRATION RATE: 16 BRPM | TEMPERATURE: 97.9 F | HEART RATE: 90 BPM | SYSTOLIC BLOOD PRESSURE: 124 MMHG

## 2019-05-28 PROCEDURE — 99238 HOSP IP/OBS DSCHRG MGMT 30/<: CPT | Mod: GC | Performed by: PSYCHIATRY & NEUROLOGY

## 2019-05-28 PROCEDURE — G0177 OPPS/PHP; TRAIN & EDUC SERV: HCPCS

## 2019-05-28 RX ORDER — RISPERIDONE 3 MG/1
3 TABLET ORAL AT BEDTIME
Qty: 30 TABLET | Refills: 0 | Status: SHIPPED | OUTPATIENT
Start: 2019-05-28 | End: 2019-10-08

## 2019-05-28 RX ORDER — LITHIUM CARBONATE 300 MG/1
900 CAPSULE ORAL AT BEDTIME
Qty: 90 CAPSULE | Refills: 0 | Status: SHIPPED | OUTPATIENT
Start: 2019-05-28 | End: 2019-10-08

## 2019-05-28 ASSESSMENT — MIFFLIN-ST. JEOR: SCORE: 1342.2

## 2019-05-28 ASSESSMENT — ACTIVITIES OF DAILY LIVING (ADL)
DRESS: INDEPENDENT
ORAL_HYGIENE: INDEPENDENT
HYGIENE/GROOMING: INDEPENDENT

## 2019-05-28 NOTE — PLAN OF CARE
Problem: OT General Care Plan  Goal: OT Frequency  Description  Pt will complete self-assessment within 3 days of initial attendance to OT group.     Attended 2/2 occupational therapy groups offered this date. Overall congruent-bright affect and full engagement.      Occupational therapy clinic to complete a beaded bracelet. Pt Response: Demonstrated consistent performance skills as observed on previous dates; I in all steps.     Leisure game - Education was provided on various leisure categories, the purpose of leisure as an occupation in daily/weekly routine and exploration of healthy leisure interests that support recovery. Pt Response: Full participation. Reported interest in kayaking and learning to paddle board this summer. Able to apply healthy leisure activities to prompted categorical cues.         Outcome: Improving

## 2019-05-28 NOTE — PROGRESS NOTES
Team met with patient this morning.  Patient appears much improved.  She has improved insight into events leading up to admission and denies any paranoia, delusions. Patient states that she is feeling much better since admission and hat she would like to go home today.  Patient's  has arranged outpatient follow up care and patient states that she plans to stay on the medications as she is feeling much better.  Patient's  will pick her up later this afternoon.

## 2019-05-28 NOTE — DISCHARGE INSTRUCTIONS
Behavioral Discharge Planning and Instructions    Summary:   You were admitted to Station 20 on 5/17/19 with psychotic, manic symptoms under the care of Dr. Adrian.  You met with the Psychiatrist and team daily for ongoing psychiatric assessment and medication management.  You had opportunities to participate in therapeutic groups on the unit.   At this time you report your mood is stabilizing and you report you are not having thoughts or intent to harm yourself or others. You will be discharged home and will resume care with your outpatient providers.    Disposition:  Home    Main Diagnosis:   Psychosis NEC  Opiate Use Disorder      Major Treatments, Procedures and Findings:   Medications were  managed throughout your stay. An internal medicine consult was completed during your stay. You had the opportunity to participate in treatment programming while on the unit including occupational therapy, mental health support and education and spiritual services.     Symptoms to Report:   Please report if you are experiencing increased aggression and/or confusion, problematic loss of sleep, worsening mood, or thoughts of suicide to your treatment team or notify your primary provider.   IF THE SYMPTOMS YOU ARE EXPERIENCING ARE A MEDICAL EMERGENCY, CALL 911 IMMEDIATELY    Lifestyle Adjustment:   1. Adjust your lifestyle to get enough sleep, relaxation, exercise and good nutrition.  Continue to develop healthy coping skills to decrease stress and promote a healthy and sober lifestyle.  2. Abstain from all substances of abuse.  3. Take medications as prescribed.  Please work with your doctor to discuss any concerns you have with your medications or side effects you may be experiencing.  4. Follow up with appointments as scheduled.        Psychiatry Follow-up:   Appointment: Psychiatry:  Claudette Moreno: 6/24/19 at 9:00am  [facility]: [address] [place] [address] [id number] [phone] Fax [phone]      Appointment: PCP: Dr. Vicente Vaughn: (Patient will schedule if she would like an appt)  [facility]: [address] [place], [place] [id number] [phone]      Resources:   [facility] Crisis: Phone: [phone]  Based in [facility] the mobile Crisis Response Team (CRT) provides an array of community-based, immediate, mental health crisis services to children and adults experiencing a mental health crisis. Our services are provided by a multi-disciplinary team, under the direct supervision of a mental health professional leader, to residents of [facility]. The Crisis Response Team can travel to homes or community locations, in the four-county area, to de-escalate situations and help individuals in crisis cope with immediate stressors.  Our services include:  ~Crisis response line  ~Crisis screening  ~Face-to-face assessment and intervention  ~Mobile outreach crisis stabilization  ~Crisis prevention planning  ~Residential adult crisis stabilization    [facility]   [address] [address] [address]   Phone: [phone]    General Medication Instructions:   See your medication sheet(s) for instructions.   Take all medicines as directed.  Make no changes unless your doctor suggests them.   Go to all your doctor visits.  Be sure to have all your required lab tests. This way, your medicines can be refilled on time.  Do not use any drugs not prescribed by your doctor.    The treatment team has appreciated the opportunity to work with you.  We wish you the best in the future.    If you have any questions or concerns our unit number is [phone].
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
## 2019-05-28 NOTE — PROGRESS NOTES
Pt was out in the milieu. She was social and pleasant with staff and peers. She denied any depression or anxiety. She did not have any delusional statements and did not appear pt have funmilayo psychotic symptoms. She denied any SI or A/V hallucinations. She would like to be discharged tomorrow and writer told pt she could discuss this with the team in the morning.

## 2019-05-29 NOTE — PROGRESS NOTES
Pt discharged as per order.Pt denied SI.  Reviewed discharge paperwork with pt.  Pt pleasant and cooperative.  Pt discharged with all belongings, discharge paperwork, and ordered discharge medications.  Pt dts'  came and picked pt up.

## 2019-10-08 ENCOUNTER — HOSPITAL ENCOUNTER (INPATIENT)
Facility: CLINIC | Age: 39
LOS: 5 days | Discharge: HOME | DRG: 885 | End: 2019-10-14
Attending: EMERGENCY MEDICINE | Admitting: PSYCHIATRY & NEUROLOGY
Payer: COMMERCIAL

## 2019-10-08 DIAGNOSIS — F25.0 SCHIZOAFFECTIVE DISORDER, BIPOLAR TYPE (H): ICD-10-CM

## 2019-10-08 DIAGNOSIS — R45.850 HOMICIDAL IDEATION: ICD-10-CM

## 2019-10-08 DIAGNOSIS — R45.851 SUICIDAL IDEATION: ICD-10-CM

## 2019-10-08 DIAGNOSIS — F25.8 OTHER SCHIZOAFFECTIVE DISORDERS (H): Primary | ICD-10-CM

## 2019-10-08 DIAGNOSIS — R44.0 HEARING VOICES: ICD-10-CM

## 2019-10-08 PROCEDURE — 99283 EMERGENCY DEPT VISIT LOW MDM: CPT | Mod: Z6 | Performed by: EMERGENCY MEDICINE

## 2019-10-08 PROCEDURE — 90791 PSYCH DIAGNOSTIC EVALUATION: CPT

## 2019-10-08 PROCEDURE — 99285 EMERGENCY DEPT VISIT HI MDM: CPT | Mod: 25 | Performed by: EMERGENCY MEDICINE

## 2019-10-08 RX ORDER — BUPRENORPHINE AND NALOXONE 8; 2 MG/1; MG/1
2.5 FILM, SOLUBLE BUCCAL; SUBLINGUAL DAILY
Status: ON HOLD | COMMUNITY
End: 2019-10-14

## 2019-10-08 RX ORDER — RISPERIDONE 0.5 MG/1
0.5 TABLET ORAL DAILY
Status: ON HOLD | COMMUNITY
End: 2019-10-14

## 2019-10-08 ASSESSMENT — ENCOUNTER SYMPTOMS
NECK PAIN: 0
CHEST TIGHTNESS: 0
SLEEP DISTURBANCE: 0
ARTHRALGIAS: 0
VOMITING: 0
DYSURIA: 0
NAUSEA: 0
NECK STIFFNESS: 0
HALLUCINATIONS: 1
NUMBNESS: 0
SEIZURES: 0
LIGHT-HEADEDNESS: 0
DIARRHEA: 0
SORE THROAT: 0
CHILLS: 0
FEVER: 0
ABDOMINAL PAIN: 0
NERVOUS/ANXIOUS: 1
HEADACHES: 0

## 2019-10-08 ASSESSMENT — MIFFLIN-ST. JEOR: SCORE: 1334.95

## 2019-10-08 NOTE — ED TRIAGE NOTES
Here because  felt she needed to be checked out  Has schizophernia and bipolar denies hearing voices

## 2019-10-08 NOTE — PHARMACY-ADMISSION MEDICATION HISTORY
Admission Medication History Completed by Pharmacy    Sources used to verify prior to admission medications:   - Patient interview  - Prescription directions and fill records from    Risperidone: [id number] - [place], [place] - [address]  Suboxone: [facility] [place], [place] - [address]    Medication Adherence:   Poor     [place]  Report:  1) 10-4-2019: Suboxone 8-2 film, qty 22, 9-day supply  2) 2019: Suboxone 8-2 film, qty 53, 21-day supply  3) 9-: Suboxone 8-2 film, qty 28, 14-day supply    Additional Information:   - Difficult patient interview due to current symptoms. Patient is a poor historian, so all medications were verified with pharmacy fill records.  - Suboxone: Prescribed 2.5 films daily since 19, previous dosing was 2 films daily. Discussed with patient, who was unable to clarify what time(s) of day she takes medication or date of last dose.     - Lithium: Last prescribed 300mg PO daily on 2019 for 21-day supply. Patient states she self-stopped due to nausea. Previously on higher doses (900mg HS).     - Risperidone: Last prescribed 0.5mg PO daily (2019 for 21-day supply). Previously prescribed 3mg PO daily (2019 for 21-day supply). Attempted to discuss date of last dose with patient but she was unable to engage in conversation due to current symptoms.     Prior to Admission Medication List:  Prior to Admission Medications   Prescriptions Last Dose Informant     buprenorphine HCl-naloxone HCl (SUBOXONE) 8-2 MG per film Past Week Pharmacy     Sig: Place 2.5 Film under the tongue daily   etonogestrel (IMPLANON/NEXPLANON) 68 MG IMPL  Self     Si each by Subdermal route once   risperiDONE (RISPERDAL) 0.5 MG tablet Past Month Pharmacy     Sig: Take 0.5 mg by mouth daily           Time spent: 20 minutes  -----------  Penny Goodrich, Pharm.D., [facility]  Behavioral Health Pharmacist  [facility]  ([facility])  Ascom: *[phone] or [phone] (1pm to 9pm daily)

## 2019-10-08 NOTE — ED PROVIDER NOTES
"  History     Chief Complaint   Patient presents with     Mental Health Problem     schizophrenia hearing voices bizare behavior     HPI  Erma Gregory is a 38 year old female who has a past medical history of schizophrenia disorder presenting with bizarre behavior according to the .  She is suicidal with plan to shoot herself.  She also has thoughts about hurting others, she says she wants to hurt others but does not have a specific person.  She says she is hearing voices but that they are saying nothing.  No hallucinations.  Denies drugs or alcohol use.  She says been she has been taking her medications however she denied this to our behavioral .  She denies any physical discomfort.    I have reviewed the Medications, Allergies, Past Medical and Surgical History, and Social History in the Epic system.    Review of Systems   Constitutional: Negative for chills and fever.   HENT: Negative for sore throat.    Respiratory: Negative for chest tightness.    Cardiovascular: Negative for chest pain.   Gastrointestinal: Negative for abdominal pain, diarrhea, nausea and vomiting.   Genitourinary: Negative for dysuria.   Musculoskeletal: Negative for arthralgias, neck pain and neck stiffness.   Neurological: Negative for seizures, syncope, light-headedness, numbness and headaches.   Psychiatric/Behavioral: Positive for hallucinations and suicidal ideas. Negative for self-injury and sleep disturbance. The patient is nervous/anxious.    All other systems reviewed and are negative.      Physical Exam   BP: 117/77  Pulse: 116  Temp: 98.5  F (36.9  C)  Resp: 18  Height: 165.1 cm (5' 5\")  Weight: 66.4 kg (146 lb 6.4 oz)  SpO2: 99 %      Physical Exam  Physical Exam   Constitutional: oriented to person, place, and time. appears well-developed and well-nourished.   HENT:   Head: Normocephalic and atraumatic.   Neck: Normal range of motion.   Pulmonary/Chest: Effort normal. No respiratory distress.   Cardiac: No " murmurs, rubs, gallops. RRR.  Abdominal: Abdomen soft, nontender, nondistended. No rebound tenderness.  MSK: Long bones without deformity or evidence of trauma  Neurological: alert and oriented to person, place, and time.   Skin: Skin is warm and dry.   Psychiatric: Poor eye contact.  Appears to be responding to internal stimuli.  There is a latency with speech.    ED Course        Procedures          Labs Ordered and Resulted from Time of ED Arrival Up to the Time of Departure from the ED - No data to display         Assessments & Plan (with Medical Decision Making)   MDM  Patient is presenting with what appears to be decompensation of her psychiatric illness.  She is suicidal and homicidal with the plan.  Patient be admitted to psychiatry for further care.  I do not suspect alcohol or drug use tonight however we will get a urine drug screen.  The patient at this point agrees with this plan however will place her on a hold if she attempts to leave.  We will try to discuss with the , he is not here in the emergency department.    I have reviewed the nursing notes.    I have reviewed the findings, diagnosis, plan and need for follow up with the patient.    New Prescriptions    No medications on file       Final diagnoses:   Suicidal ideation   Homicidal ideation   Hearing voices       10/8/2019   [facility], [place], EMERGENCY DEPARTMENT     Edison Aleman MD  10/08/19 [id number]

## 2019-10-08 NOTE — ED NOTES
Sign out note: Erma Gregory is a 38 year old female was signed out to me by Dr. Aleman at 0700 am.  Please see initial dictation for full details and history.  Patient has a history of schizophrenia and presented with bizarre behavior.  She is suicidal with a plan to shoot herself.  She also is thinking about hurting others.  Plan at time of sign out is admit the patient to a psychiatric bed.  She is currently in the ED awaiting inpatient bed availability..       Course in the ED:  She had an uneventful day shift and was signed out to the evening physician still awaiting an inpatient bed availability.     This note was created in part by the use of Dragon voice recognition dictation system. Inadvertent grammatical errors and typographical errors may still exist.  MD Lionel Delaney Alda L, MD  10/08/19 4943

## 2019-10-08 NOTE — ED NOTES
Bed: HW03  Expected date:   Expected time:   Means of arrival:   Comments:  [facility] 37 y/o F psych eval

## 2019-10-08 NOTE — ED NOTES
Staring in to space when doing interview would answer questions then would change answer Detached with answers to questions.aswers yes to all questions to suicide questions then would ask to repeat the  questions and would answer differently  Not consistant with answers states will kill others with gun but will not say who.

## 2019-10-09 LAB — LITHIUM SERPL-SCNC: 1.01 MMOL/L (ref 0.6–1.2)

## 2019-10-09 PROCEDURE — 12400001 ZZH R&B MH UMMC

## 2019-10-09 PROCEDURE — 36415 COLL VENOUS BLD VENIPUNCTURE: CPT | Performed by: PSYCHIATRY & NEUROLOGY

## 2019-10-09 PROCEDURE — G0177 OPPS/PHP; TRAIN & EDUC SERV: HCPCS

## 2019-10-09 PROCEDURE — 99222 1ST HOSP IP/OBS MODERATE 55: CPT | Mod: AI | Performed by: PSYCHIATRY & NEUROLOGY

## 2019-10-09 PROCEDURE — 25000132 ZZH RX MED GY IP 250 OP 250 PS 637: Performed by: PSYCHIATRY & NEUROLOGY

## 2019-10-09 PROCEDURE — 80178 ASSAY OF LITHIUM: CPT | Performed by: PSYCHIATRY & NEUROLOGY

## 2019-10-09 RX ORDER — BUPRENORPHINE AND NALOXONE 4; 1 MG/1; MG/1
3 FILM, SOLUBLE BUCCAL; SUBLINGUAL DAILY
Status: DISCONTINUED | OUTPATIENT
Start: 2019-10-09 | End: 2019-10-14 | Stop reason: HOSPADM

## 2019-10-09 RX ORDER — BISACODYL 10 MG
10 SUPPOSITORY, RECTAL RECTAL DAILY PRN
Status: DISCONTINUED | OUTPATIENT
Start: 2019-10-09 | End: 2019-10-14 | Stop reason: HOSPADM

## 2019-10-09 RX ORDER — RISPERIDONE 0.5 MG/1
0.5 TABLET ORAL DAILY
Status: DISCONTINUED | OUTPATIENT
Start: 2019-10-09 | End: 2019-10-10

## 2019-10-09 RX ORDER — TRAZODONE HYDROCHLORIDE 50 MG/1
50 TABLET, FILM COATED ORAL
Status: DISCONTINUED | OUTPATIENT
Start: 2019-10-09 | End: 2019-10-14 | Stop reason: HOSPADM

## 2019-10-09 RX ORDER — ALUMINA, MAGNESIA, AND SIMETHICONE 2400; 2400; 240 MG/30ML; MG/30ML; MG/30ML
30 SUSPENSION ORAL EVERY 4 HOURS PRN
Status: DISCONTINUED | OUTPATIENT
Start: 2019-10-09 | End: 2019-10-14 | Stop reason: HOSPADM

## 2019-10-09 RX ORDER — OLANZAPINE 10 MG/2ML
10 INJECTION, POWDER, FOR SOLUTION INTRAMUSCULAR
Status: DISCONTINUED | OUTPATIENT
Start: 2019-10-09 | End: 2019-10-14 | Stop reason: HOSPADM

## 2019-10-09 RX ORDER — ACETAMINOPHEN 325 MG/1
650 TABLET ORAL EVERY 4 HOURS PRN
Status: DISCONTINUED | OUTPATIENT
Start: 2019-10-09 | End: 2019-10-14 | Stop reason: HOSPADM

## 2019-10-09 RX ORDER — OLANZAPINE 10 MG/1
10 TABLET ORAL
Status: DISCONTINUED | OUTPATIENT
Start: 2019-10-09 | End: 2019-10-14 | Stop reason: HOSPADM

## 2019-10-09 RX ORDER — HYDROXYZINE HYDROCHLORIDE 25 MG/1
25 TABLET, FILM COATED ORAL EVERY 4 HOURS PRN
Status: DISCONTINUED | OUTPATIENT
Start: 2019-10-09 | End: 2019-10-14 | Stop reason: HOSPADM

## 2019-10-09 RX ADMIN — BUPRENORPHINE HYDROCHLORIDE, NALOXONE HYDROCHLORIDE 3 FILM: 4; 1 FILM, SOLUBLE BUCCAL; SUBLINGUAL at 18:35

## 2019-10-09 RX ADMIN — RISPERIDONE 0.5 MG: 0.5 TABLET ORAL at 09:28

## 2019-10-09 ASSESSMENT — ACTIVITIES OF DAILY LIVING (ADL)
ORAL_HYGIENE: INDEPENDENT
NUMBER_OF_TIMES_PATIENT_HAS_FALLEN_WITHIN_LAST_SIX_MONTHS: 1
BATHING: 0-->INDEPENDENT
RETIRED_COMMUNICATION: 0-->UNDERSTANDS/COMMUNICATES WITHOUT DIFFICULTY
TOILETING: 0-->INDEPENDENT
SWALLOWING: 0-->SWALLOWS FOODS/LIQUIDS WITHOUT DIFFICULTY
TRANSFERRING: 0-->INDEPENDENT
LAUNDRY: WITH SUPERVISION
FALL_HISTORY_WITHIN_LAST_SIX_MONTHS: YES
COGNITION: 0 - NO COGNITION ISSUES REPORTED
DRESS: INDEPENDENT
DRESS: 0-->INDEPENDENT
RETIRED_EATING: 0-->INDEPENDENT
HYGIENE/GROOMING: INDEPENDENT
AMBULATION: 0-->INDEPENDENT

## 2019-10-09 NOTE — PROGRESS NOTES
"   10/09/19 1500   Psycho Education   Type of Intervention structured groups   Response participates with cues/redirection   Hours 0.5   Treatment Detail visualization and goal setting     CTC lead a processing group on visualization and the importance of identifying goals for the future in helping to manage mental health. CTC encouraged participation by letting pts ask questions and giving them time to answer at their own pace. Pt did not actively participate in group by sharing their thoughts and feelings on the subject matter, but did nod head in response to statements demonstrating participation. Pt did say \"thank you\" at end of group when everyone was leaving group.      Group was shorter then normal due to scheduling conflict with other group times.    "

## 2019-10-09 NOTE — H&P
"[facility], [place]   Psychiatric History and Physical  Admission date: 10/8/2019        Chief Complaint:   \"I had a really bad night.\"         HPI:     The patient is a 37yo female with a history of schizoaffective disorder who was admitted after endorsing command auditory hallucinations to kill herself and others. Today, the patient reports that she was \"really out of it.\" Says that she just had a bad night and is now denying SI or HI. When asked about AH, she reiterates that she was \"really out of it.\" Denies VH. Says that she was taking her Suboxone and Risperdal and then admits that she wasn't. Says she is trying to wean herself off Suboxone and is down to 12mg daily.     Did attempt to reach her  Wilman at [phone] and left voicemail. Records indicate that he would support commitment due to patient's non adherence to medications and bizarre behavior.      Per ER: Erma Gregory is a 38 year old female who has a past medical history of schizophrenia disorder presenting with bizarre behavior according to the .  She is suicidal with plan to shoot herself.  She also has thoughts about hurting others, she says she wants to hurt others but does not have a specific person.  She says she is hearing voices but that they are saying nothing.  No hallucinations.  Denies drugs or alcohol use.  She says been she has been taking her medications however she denied this to our behavioral .  She denies any physical discomfort.        Past Psychiatric History:     Hospitalized here in May 2019. Was on 3mg of Risperdal and 900mg of Lithium at that time.         Substance Use and History:     Opiate use disorder on Suboxone maintenance.   Denies alcohol issues.         Past Medical History:   PAST MEDICAL HISTORY:   Past Medical History:   Diagnosis Date     Bipolar 1 disorder, depressed (H)      Combinations of drug dependence excluding opioid type drug, in remission (H)     in " "methadone program     Diabetes (H)      Schizo affective schizophrenia (H)        PAST SURGICAL HISTORY: History reviewed. No pertinent surgical history.          Family History:   FAMILY HISTORY:   Family History   Problem Relation Age of Onset     Cancer Mother      Cancer Maternal Grandmother      Bipolar Disorder Father            Social History:   Please see the full psychosocial profile from the clinical treatment coordinator.   SOCIAL HISTORY:   Social History     Tobacco Use     Smoking status: Current Every Day Smoker     Packs/day: 0.50     Years: 8.00     Pack years: 4.00     Types: Cigarettes     Smokeless tobacco: Never Used   Substance Use Topics     Alcohol use: Yes     Comment: 1 week ago            Physical ROS:   The 10-point review of systems was negative except as noted in HPI.         PTA Medications:     Medications Prior to Admission   Medication Sig Dispense Refill Last Dose     buprenorphine HCl-naloxone HCl (SUBOXONE) 8-2 MG per film Place 2.5 Film under the tongue daily   10/7/2019     risperiDONE (RISPERDAL) 0.5 MG tablet Take 0.5 mg by mouth daily   Past Week at Unknown time     etonogestrel (IMPLANON/NEXPLANON) 68 MG IMPL 1 each by Subdermal route once   placed 2017          Allergies:     Allergies   Allergen Reactions     Phenergan Dm Other (See Comments)     agitation          Labs:   No results found for this or any previous visit (from the past 48 hour(s)).       Physical and Psychiatric Examination:     /63   Pulse 119   Temp 97.6  F (36.4  C) (Oral)   Resp 16   Ht 1.651 m (5' 5\")   Wt 66.4 kg (146 lb 6.4 oz)   LMP 10/01/2019   SpO2 97%   BMI 24.36 kg/m    Weight is 146 lbs 6.4 oz  Body mass index is 24.36 kg/m .    Physical Exam:  I have reviewed the physical exam as documented by by the medical team and agree with findings and assessment and have no additional findings to add at this time.    Mental Status Exam:  Appearance: awake, alert and adequately " groomed  Attitude:  evasive and somewhat cooperative  Eye Contact:  fair  Mood:  good  Affect:  intensity is blunted  Speech:  paucity of speech  Language: fluent and intact in English  Psychomotor, Gait, Musculoskeletal:  no evidence of tardive dyskinesia, dystonia, or tics  Thought Process:  illogical  Associations:  no loose associations  Thought Content:  no evidence of suicidal ideation or homicidal ideation  Insight:  limited  Judgement:  limited  Oriented to:  time, person, and place  Attention Span and Concentration:  fair  Recent and Remote Memory:  poor  Fund of Knowledge:  appropriate         Admission Diagnoses:      Schizoaffective disorder, bipolar type  Opiate use disorder on Suboxone maintenance         Assessment & Plan:     1) Continue Suboxone at 12mg Qday.   2) Restart Risperdal 0.5mg Qday.   3) Will obtain collateral information from patient's . Called him and left voicemail.   4) Patient was admitted voluntarily but then signed a 12-hour intent to leave so was placed on a 72-hour hold.     Disposition Plan   Reason for ongoing admission: is unable to care for self due to severe psychosis or earlene  Discharge location: TBD  Discharge Medications: not ordered  Follow-up Appointments: not scheduled  Legal Status: 72 hour hold  Entered by: Chuck Piedra on 10/9/2019 at 6:07 AM

## 2019-10-09 NOTE — PROGRESS NOTES
SPIRITUAL HEALTH SERVICES  SPIRITUAL ASSESSMENT Progress Note  [facility] ([facility]) [facility]     REFERRAL SOURCE: Responded to a  request    I will follow up on Friday to assess the benefit of spiritual support at that time.    PLAN: I will follow up on Friday.     Emir Al, [facility], DMin  Staff   Pager [phone]

## 2019-10-09 NOTE — ED NOTES
"ED to Behavioral Floor Handoff    SITUATION  Erma Gregory is a 38 year old female who speaks English and lives in a home with others The patient arrived in the ED by ambulance from home with a complaint of Mental Health Problem (schizophrenia hearing voices bizare behavior)  .The patient's current symptoms started/worsened past several weeks and during this time the symptoms have increased.   In the ED, pt was diagnosed with   Final diagnoses:   Suicidal ideation   Homicidal ideation   Hearing voices        Initial vitals were: BP: 117/77  Pulse: 116  Temp: 98.5  F (36.9  C)  Resp: 18  Height: 165.1 cm (5' 5\")  Weight: 66.4 kg (146 lb 6.4 oz)  SpO2: 99 %   --------  Is the patient diabetic? No   If yes, last blood glucose? --     If yes, was this treated in the ED? --  --------  Is the patient inebriated (ETOH) No or Impaired on other substances? No  MSSA done? N/A  Last MSSA score: --    Were withdrawal symptoms treated? N/A  Does the patient have a seizure history? No. If yes, date of most recent seizure--  --------  Is the patient patient experiencing suicidal ideation? reports occasional suicidal thoughts representing feeling that life is not worth feeling    Homicidal ideation? Reports thoughts of harming non specific others  Self-injurious behavior/urges? denies current or recent self injurious behavior or ideation.  ------  Was pt aggressive in the ED No  Was a code called No  Is the pt now cooperative? Yes  -------  Meds given in ED: Medications - No data to display   Family present during ED course? No  Family currently present? No    BACKGROUND  Does the patient have a cognitive impairment or developmental disability? No  Allergies:   Allergies   Allergen Reactions     Phenergan Dm Other (See Comments)     agitation   .   Social demographics are   Social History     Socioeconomic History     Marital status:      Spouse name: None     Number of children: None     Years of education: None     Highest " "education level: None   Occupational History     None   Social Needs     Financial resource strain: None     Food insecurity:     Worry: None     Inability: None     Transportation needs:     Medical: None     Non-medical: None   Tobacco Use     Smoking status: Current Every Day Smoker     Packs/day: 0.50     Years: 8.00     Pack years: 4.00     Types: Cigarettes     Smokeless tobacco: Never Used   Substance and Sexual Activity     Alcohol use: Yes     Comment: 1 week ago     Drug use: No     Comment: unsure when last used is in remission from drugs     Sexual activity: Yes     Partners: Male     Birth control/protection: Implant   Lifestyle     Physical activity:     Days per week: None     Minutes per session: None     Stress: None   Relationships     Social connections:     Talks on phone: None     Gets together: None     Attends Muslim service: None     Active member of club or organization: None     Attends meetings of clubs or organizations: None     Relationship status: None     Intimate partner violence:     Fear of current or ex partner: None     Emotionally abused: None     Physically abused: None     Forced sexual activity: None   Other Topics Concern     Parent/sibling w/ CABG, MI or angioplasty before 65F 55M? No   Social History Narrative     None        ASSESSMENT  Labs results Labs Ordered and Resulted from Time of ED Arrival Up to the Time of Departure from the ED - No data to display   Imaging Studies: No results found for this or any previous visit (from the past 24 hour(s)).   Most recent vital signs /82   Pulse 119   Temp 98.5  F (36.9  C) (Oral)   Resp 16   Ht 1.651 m (5' 5\")   Wt 66.4 kg (146 lb 6.4 oz)   LMP 10/01/2019   SpO2 95%   BMI 24.36 kg/m     Abnormal labs/tests/findings requiring intervention:---   Pain control: pt had none  Nausea control: pt had none    RECOMMENDATION  Are any infection precautions needed (MRSA, VRE, etc.)? No If yes, what infection? --  ---  Does " the patient have mobility issues? independently. If yes, what device does the pt use? ---  ---  Is patient on 72 hour hold or commitment? No If on 72 hour hold, have hold and rights been given to patient? N/A  Are admitting orders written if after 10 p.m. ?N/A  Tasks needing to be completed:---     GEOFF LOREDO RN   [facility]--    [phone] [facility]

## 2019-10-09 NOTE — PROGRESS NOTES
10/09/19 0427   Valuables   Patient Belongings locker;remains with patient   Patient Belongings Remaining with Patient clothing   Patient Belongings Put in Hospital Secure Location (Security or Locker, etc.) necklace;shoes;clothing   Did you bring any home meds/supplements to the hospital?  No     Belongings include: t-shirt, yoga pants, bra, 1 pair underwear, necklace and sandals.     A               Admission:  I am responsible for any personal items that are not sent to the safe or pharmacy.  [facility] is not responsible for loss, theft or damage of any property in my possession.    Signature:  _________________________________ Date: _______  Time: _____                                              Staff Signature:  ____________________________ Date: ________  Time: _____      2nd Staff person, if patient is unable/unwilling to sign:    Signature: ________________________________ Date: ________  Time: _____     Discharge:  [facility] has returned all of my personal belongings:    Signature: _________________________________ Date: ________  Time: _____                                          Staff Signature:  ____________________________ Date: ________  Time: _____
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
## 2019-10-09 NOTE — PLAN OF CARE
BEHAVIORAL TEAM DISCUSSION    Participants: Dr. Chuck Piedra; Gloria MOLINASW; Mary Jane Craig RN; Chante Mckeon RN  Progress: The patient is currently psychotic  Anticipated length of stay: Unknown  Continued Stay Criteria/Rationale: Patient needs medication management and stabilization of psychosis.  Medical/Physical: Nothing reported  Precautions:   Behavioral Orders   Procedures    Code 1 - Restrict to Unit    Routine Programming     As clinically indicated    Status 15     Every 15 minutes.    Suicide precautions     Patients on Suicide Precautions should have a Combination Diet ordered that includes a Diet selection(s) AND a Behavioral Tray selection for Safe Tray - with utensils, or Safe Tray - NO utensils       Plan: The patient has been off of her medications and will need them restarted.  Psychosis needs to be stabilized so she is not a danger to herself and others.  Rationale for change in precautions or plan: No changes

## 2019-10-09 NOTE — PLAN OF CARE
"\"I want to be able to get off the Buprenorphine.\"  Indicates feels needs to be in the hospital as \"I was super out of it.  I didn't know what I was saying.\"  Indicates is thinking more clearly now, denies hearing voices, racing or ruminating thoughts.  Feels focus and concentration are poor.  Denies feeling depressed, has some anxiety, rating this at a 1 on a scale with 10 being the worst.  Denies suicidal ideation at this time, admits to having suicidal thoughts with a plan to shoot self prior to admission.  Indicates  has a gun at home and she has asked him to remove it.  The gun is locked and he will be taking it to his mother's house.  Presently denies wish to be dead or thoughts of self harm.  Sleep has been poor over the past several months, feels slept better the last 2 nights.  Has had one previous mental health admission which was last May with a catatonic episode.   was doing well after that time.  Claims is taking medications regularly the past month after not taking them for a while.   sees a psychiatrist outpatient and sees a therapist monthly.  Phone call received from  Wilman Gregory who indicated that \"she is probably lying to you about how she is doing.\"   states that patient has coated all the objects in their home with salad oil in an effort to rid the home of evil spirits as patient feels she is possessed.  Does not feel has a mental illness and is involved with \"Born again Jain Christians who think she needs to be exorcised.\"  States these people convinced her to not take medications as they were not needed so she stopped them.  Patient states there are many spirits in the house who touch her and bite her.  Patient has done cleansing with ever and other spiritual means.   feels that perhaps a long acting antipsychotic injection may be beneficial as this was discussed last hospitalization.   at times works nights and is not present to give the " "patient her medications.  \"I give them to her and I know she doesn't take them if I'm not there.\"  appears very supportive, states has dealt with these issues throughout long marriage. Patient has been visible in the milieu, has attended some groups.  Has been noted to stare into the distance at times, at one point did appear to exhibit attending behaviors, was slightly distracted during assessment.  "

## 2019-10-09 NOTE — PLAN OF CARE
"Patient is a 38 year old female admitted from [place] ED after having psychosis. Patient's  called 911 and EMS brought her to the ED. Patient is voluntary. Per ED patient stated she had a plan to shoot herself but they don't think she has access to guns. Also that she was experiencing auditory hallucinations however she wouldn't say what they saying or what she was hearing. ED stated that her being listed as diabetic in her history is an error and that patient has no medical history.    Per DEC assessment \"Patient was guarded and her responses were disorganized and minimal. She's hearing voices telling her to kill herself and other. She denied any specific plan. When asked who she would kill, she said, \"everyone.\". Her Prior diagnoses were Psychosis/Schizoaffective, dipolar d/o and opioid use d/o. She was admitted to [facility] in May 2019, she was completely catatonic\". When asking what brought her in patient stated \"I just wasn't feeling good\".    Patient has been missing her medication dosages and is a poor historian stating different dates to the ED and writer. Home meds were not ordered, an order for Med hx IP Pharmacy Consult is placed for patient's meds to be later verified. Patient refused flu shot.     She denied HI and first said no to SI, then stated \"Actually I did have some the other day I came in. No plan, it was the first time ever.\". Patient didn't seem to be responding to AVH and seemed alert and oriented, she denied having AVH. Currently patient is jobless and lives at home with  who is employed. Patient was cooperative with half of assessment and stated she was tired wanting to get to bed, assessment will be completed later.   "

## 2019-10-10 LAB
ALBUMIN SERPL-MCNC: 3.4 G/DL (ref 3.4–5)
ALP SERPL-CCNC: 74 U/L (ref 40–150)
ALT SERPL W P-5'-P-CCNC: 15 U/L (ref 0–50)
ANION GAP SERPL CALCULATED.3IONS-SCNC: 8 MMOL/L (ref 3–14)
AST SERPL W P-5'-P-CCNC: 6 U/L (ref 0–45)
BASOPHILS # BLD AUTO: 0 10E9/L (ref 0–0.2)
BASOPHILS NFR BLD AUTO: 0.1 %
BILIRUB DIRECT SERPL-MCNC: <0.1 MG/DL (ref 0–0.2)
BILIRUB SERPL-MCNC: 0.4 MG/DL (ref 0.2–1.3)
BUN SERPL-MCNC: 17 MG/DL (ref 7–30)
CALCIUM SERPL-MCNC: 8.6 MG/DL (ref 8.5–10.1)
CHLORIDE SERPL-SCNC: 106 MMOL/L (ref 94–109)
CO2 SERPL-SCNC: 25 MMOL/L (ref 20–32)
CREAT SERPL-MCNC: 0.8 MG/DL (ref 0.52–1.04)
DIFFERENTIAL METHOD BLD: NORMAL
EOSINOPHIL # BLD AUTO: 0.1 10E9/L (ref 0–0.7)
EOSINOPHIL NFR BLD AUTO: 0.7 %
ERYTHROCYTE [DISTWIDTH] IN BLOOD BY AUTOMATED COUNT: 12.6 % (ref 10–15)
GFR SERPL CREATININE-BSD FRML MDRD: >90 ML/MIN/{1.73_M2}
GLUCOSE SERPL-MCNC: 91 MG/DL (ref 70–99)
HCT VFR BLD AUTO: 40.8 % (ref 35–47)
HGB BLD-MCNC: 13.8 G/DL (ref 11.7–15.7)
IMM GRANULOCYTES # BLD: 0 10E9/L (ref 0–0.4)
IMM GRANULOCYTES NFR BLD: 0.1 %
LYMPHOCYTES # BLD AUTO: 2.9 10E9/L (ref 0.8–5.3)
LYMPHOCYTES NFR BLD AUTO: 40.8 %
MCH RBC QN AUTO: 29.3 PG (ref 26.5–33)
MCHC RBC AUTO-ENTMCNC: 33.8 G/DL (ref 31.5–36.5)
MCV RBC AUTO: 87 FL (ref 78–100)
MONOCYTES # BLD AUTO: 0.6 10E9/L (ref 0–1.3)
MONOCYTES NFR BLD AUTO: 8 %
NEUTROPHILS # BLD AUTO: 3.5 10E9/L (ref 1.6–8.3)
NEUTROPHILS NFR BLD AUTO: 50.3 %
NRBC # BLD AUTO: 0 10*3/UL
NRBC BLD AUTO-RTO: 0 /100
PLATELET # BLD AUTO: 309 10E9/L (ref 150–450)
POTASSIUM SERPL-SCNC: 3.8 MMOL/L (ref 3.4–5.3)
PROT SERPL-MCNC: 6.6 G/DL (ref 6.8–8.8)
RBC # BLD AUTO: 4.71 10E12/L (ref 3.8–5.2)
SODIUM SERPL-SCNC: 139 MMOL/L (ref 133–144)
TSH SERPL DL<=0.005 MIU/L-ACNC: 1.53 MU/L (ref 0.4–4)
WBC # BLD AUTO: 7 10E9/L (ref 4–11)

## 2019-10-10 PROCEDURE — 12400001 ZZH R&B MH UMMC

## 2019-10-10 PROCEDURE — 25000132 ZZH RX MED GY IP 250 OP 250 PS 637: Performed by: PSYCHIATRY & NEUROLOGY

## 2019-10-10 PROCEDURE — 84443 ASSAY THYROID STIM HORMONE: CPT | Performed by: PSYCHIATRY & NEUROLOGY

## 2019-10-10 PROCEDURE — 99232 SBSQ HOSP IP/OBS MODERATE 35: CPT | Performed by: PSYCHIATRY & NEUROLOGY

## 2019-10-10 PROCEDURE — 36415 COLL VENOUS BLD VENIPUNCTURE: CPT | Performed by: PSYCHIATRY & NEUROLOGY

## 2019-10-10 PROCEDURE — 80053 COMPREHEN METABOLIC PANEL: CPT | Performed by: PSYCHIATRY & NEUROLOGY

## 2019-10-10 PROCEDURE — 85025 COMPLETE CBC W/AUTO DIFF WBC: CPT | Performed by: PSYCHIATRY & NEUROLOGY

## 2019-10-10 PROCEDURE — 82248 BILIRUBIN DIRECT: CPT | Performed by: PSYCHIATRY & NEUROLOGY

## 2019-10-10 RX ORDER — RISPERIDONE 1 MG/1
1 TABLET ORAL DAILY
Status: DISCONTINUED | OUTPATIENT
Start: 2019-10-10 | End: 2019-10-11

## 2019-10-10 RX ORDER — RISPERIDONE 25 MG/2 ML
25 KIT INTRAMUSCULAR
Status: DISCONTINUED | OUTPATIENT
Start: 2019-10-11 | End: 2019-10-14 | Stop reason: HOSPADM

## 2019-10-10 RX ADMIN — TRAZODONE HYDROCHLORIDE 50 MG: 50 TABLET ORAL at 21:07

## 2019-10-10 RX ADMIN — BUPRENORPHINE HYDROCHLORIDE, NALOXONE HYDROCHLORIDE 3 FILM: 4; 1 FILM, SOLUBLE BUCCAL; SUBLINGUAL at 09:14

## 2019-10-10 RX ADMIN — RISPERIDONE 1 MG: 1 TABLET ORAL at 20:11

## 2019-10-10 ASSESSMENT — ACTIVITIES OF DAILY LIVING (ADL)
LAUNDRY: WITH SUPERVISION
DRESS: SCRUBS (BEHAVIORAL HEALTH);INDEPENDENT
HYGIENE/GROOMING: INDEPENDENT
ORAL_HYGIENE: INDEPENDENT

## 2019-10-10 ASSESSMENT — MIFFLIN-ST. JEOR: SCORE: 1327.23

## 2019-10-10 NOTE — PROGRESS NOTES
"[facility], [place]   Psychiatric Progress Note        Interim History:   The patient's care was discussed with the treatment team during the daily team meeting and/or staff's chart notes were reviewed.  Staff report patient has been compliant with Risperdal but it did make her tired.     The patient reports that she \"feels like I am possessed.\" Says that she seen \"a spirit\" and has been doing \"warfare prayers\" to combat it. Mood is \"crabby\" and then becomes tearful when discussing what she has experienced. Not sleeping well. Does feel that Risperdal makes her tired. Agreeable to increase it tonight and to start Consta tomorrow. Denies SI. Is willing to sign in voluntarily.          Medications:       buprenorphine HCl-naloxone HCl  3 Film Sublingual Daily     risperiDONE  1 mg Oral Daily     [START ON 10/11/2019] risperiDONE microspheres ER  25 mg Intramuscular Q14 Days          Allergies:     Allergies   Allergen Reactions     Phenergan Dm Other (See Comments)     agitation          Labs:     Recent Results (from the past 24 hour(s))   Lithium level    Collection Time: 10/09/19 11:06 AM   Result Value Ref Range    Lithium Level 1.01 0.60 - 1.20 mmol/L   CBC with platelets differential    Collection Time: 10/10/19  7:53 AM   Result Value Ref Range    WBC 7.0 4.0 - 11.0 10e9/L    RBC Count 4.71 3.8 - 5.2 10e12/L    Hemoglobin 13.8 11.7 - 15.7 g/dL    Hematocrit 40.8 35.0 - 47.0 %    MCV 87 78 - 100 fl    MCH 29.3 26.5 - 33.0 pg    MCHC 33.8 31.5 - 36.5 g/dL    RDW 12.6 10.0 - 15.0 %    Platelet Count 309 150 - 450 10e9/L    Diff Method Automated Method     % Neutrophils 50.3 %    % Lymphocytes 40.8 %    % Monocytes 8.0 %    % Eosinophils 0.7 %    % Basophils 0.1 %    % Immature Granulocytes 0.1 %    Nucleated RBCs 0 0 /100    Absolute Neutrophil 3.5 1.6 - 8.3 10e9/L    Absolute Lymphocytes 2.9 0.8 - 5.3 10e9/L    Absolute Monocytes 0.6 0.0 - 1.3 10e9/L    Absolute Eosinophils 0.1 0.0 - " "0.7 10e9/L    Absolute Basophils 0.0 0.0 - 0.2 10e9/L    Abs Immature Granulocytes 0.0 0 - 0.4 10e9/L    Absolute Nucleated RBC 0.0    Comprehensive metabolic panel    Collection Time: 10/10/19  7:53 AM   Result Value Ref Range    Sodium 139 133 - 144 mmol/L    Potassium 3.8 3.4 - 5.3 mmol/L    Chloride 106 94 - 109 mmol/L    Carbon Dioxide 25 20 - 32 mmol/L    Anion Gap 8 3 - 14 mmol/L    Glucose 91 70 - 99 mg/dL    Urea Nitrogen 17 7 - 30 mg/dL    Creatinine 0.80 0.52 - 1.04 mg/dL    GFR Estimate >90 >60 mL/min/[1.73_m2]    GFR Estimate If Black >90 >60 mL/min/[1.73_m2]    Calcium 8.6 8.5 - 10.1 mg/dL    Bilirubin Total 0.4 0.2 - 1.3 mg/dL    Albumin 3.4 3.4 - 5.0 g/dL    Protein Total 6.6 (L) 6.8 - 8.8 g/dL    Alkaline Phosphatase 74 40 - 150 U/L    ALT 15 0 - 50 U/L    AST 6 0 - 45 U/L   TSH with free T4 reflex and/or T3 as indicated    Collection Time: 10/10/19  7:53 AM   Result Value Ref Range    TSH 1.53 0.40 - 4.00 mU/L   Bilirubin direct    Collection Time: 10/10/19  7:53 AM   Result Value Ref Range    Bilirubin Direct <0.1 0.0 - 0.2 mg/dL          Psychiatric Examination:     /63   Pulse 119   Temp 98.3  F (36.8  C) (Tympanic)   Resp 16   Ht 1.651 m (5' 5\")   Wt 65.6 kg (144 lb 11.2 oz)   LMP 10/01/2019   SpO2 97%   BMI 24.08 kg/m    Weight is 144 lbs 11.2 oz  Body mass index is 24.08 kg/m .  Orthostatic Vitals       Most Recent      Sitting Orthostatic /70 10/10 0900    Sitting Orthostatic Pulse (bpm) 89 10/10 0900    Standing Orthostatic /74 10/10 0900    Standing Orthostatic Pulse (bpm) 106 10/10 0900            Appearance: awake, alert and adequately groomed  Attitude:  cooperative  Eye Contact:  good  Mood:  \"crabby\"  Affect:  labile  Speech:  clear, coherent  Psychomotor Behavior:  no evidence of tardive dyskinesia, dystonia, or tics  Thought Process:  illogical  Associations:  no loose associations  Thought Content:  no evidence of suicidal ideation or homicidal ideation, " auditory hallucinations present and visual hallucinations present  Insight:  partial  Judgement:  fair  Oriented to:  time, person, and place  Attention Span and Concentration:  intact  Recent and Remote Memory:  fair    Clinical Global Impressions  First:  Considering your total clinical experience with this particular patient population, how severe are the patient's symptoms at this time?: 7 (10/09/19 0603)  Compared to the patient's condition at the START of treatment, this patient's condition is:: 4 (10/09/19 0603)  Most recent:  Considering your total clinical experience with this particular patient population, how severe are the patient's symptoms at this time?: 7 (10/09/19 0603)  Compared to the patient's condition at the START of treatment, this patient's condition is:: 4 (10/09/19 0603)         Precautions:     Behavioral Orders   Procedures     Code 1 - Restrict to Unit     Routine Programming     As clinically indicated     Status 15     Every 15 minutes.     Suicide precautions     Patients on Suicide Precautions should have a Combination Diet ordered that includes a Diet selection(s) AND a Behavioral Tray selection for Safe Tray - with utensils, or Safe Tray - NO utensils            Diagnoses:     Schizoaffective disorder, bipolar type  Opiate use disorder on Suboxone maintenance         Plan:     1) Increase Risperdal to 1mg at bedtime.   2) Start Risperdal Consta 25mg tomorrow. Patient agreeable to this.   3) Patient willing to sign in voluntarily. Would recommend 72-hour hold and petition for commitment if she changes her mind.       Disposition Plan   Reason for ongoing admission: is unable to care for self due to severe psychosis or earlene  Discharge location: home with family  Discharge Medications: not ordered  Follow-up Appointments: not scheduled  Legal Status: voluntary  Entered by: Chuck Piedra on 10/10/2019 at 10:50 AM

## 2019-10-10 NOTE — PROGRESS NOTES
10/09/19 2100   Behavioral Health   Hallucinations denies / not responding to hallucinations   Thinking distractable   Orientation date: oriented;place: oriented;person: oriented   Memory baseline memory   Insight poor   Judgement impaired   Affect blunted, flat   Mood mood is calm   Physical Appearance/Attire attire appropriate to age and situation   Hygiene other (see comment)  (fair)   1. Wish to be Dead (Past Month) No   2. Non-Specific Active Suicidal Thoughts (Past Month) No   Self Injury other (see comment)  (denies currently)   Activity other (see comment)  (intermittently present in milieu; attended evening group)   Speech clear;coherent   Medication Sensitivity no stated side effects   Psychomotor / Gait balanced;steady     Pt was intermittently visible in milieu. Mood was calm with blunted affect. Denies SI/SIB, psychotic symptoms, hallucinations, paranoia, depression. No behavioral issues or abnormal behavior observed by staff. Pt did not socially engage with peers or staff. Hygiene is fair.

## 2019-10-10 NOTE — PLAN OF CARE
"Alert and oriented in all spheres. Patient reports nearly continuous auditory hallucinations of \"a black ghost.\" On a rare occasion when patient is \"really out of it\" she can see the ghost. She tells me the ghost often makes general commentary about what is going on and other times makes derogatory or persecutory remarks. Patient denied command auditory hallucinations today but does confirm the ghost giving commands in the past. Patient says the only thing that seems to reduce the intensity of the voice is going to [place] for,   \"spiritual warfare\" against the ghost. Patient expressed worry about the Risperdal making her tired The daily dose of scheduled respirdal was rescheduled for 2000 to alleviate tiredness during the day. After reassurance and education patient was agreeable to trying the injection. Patient denied suicidal or homicidal thoughts at this time. Patient reported not having a bowel movement in the last 10-14 days. Patient said she is passing gas and denies and abdominal fullness or discomfort. She does say she typically moves her bowels daily. Patient is calm and able to articulate her needs. Will continue to monitor.  "

## 2019-10-10 NOTE — PROGRESS NOTES
10/10/19 1425   General Information   Date Initially Attended OT 10/09/19     Groups Attended: Part of OT Clinic (no charge), Mental Health Management, Mindfulness     Affect/Hygiene/Presentation: Calm mood, engaged, flat affect. No apparent hygiene concerns.     Patient Performance/Response:  -Mental Health Management: Pt actively participated in a sensory calming group focused on learning to cope and self-soothe through the sensory system. After initial sensory education was provided by writer, Pt was able to independently identify her preferred methods of using movement, sight, hearing, taste, touch, and smell to calm self as needed. Pt then engaged in a hands-on task involving creating sensory tools to utilize on the unit and after discharge as needed.   -Mindfulness: Pt actively participated in a mindfulness group focusing on reduction of anxiety, improvement of mood, coping skill exploration, and healthy routine development. The mindfulness practice was offered via supportive approaches including a gratitude exercise, stretching, essential oils, and guided attention meditation. Pt verbalized feeling relaxed at the end of the group, and was able to remain focused for the full duration.     Plan: More information needed to complete OT Initial Assessment; OT staff will meet with pt to review the role of occupational therapy and explain the value of having them involved in their treatment plan including options to meet current needs/self-identified goals. As group attendance is established, Pt will be given self-assessment to inform OT initial assessment.

## 2019-10-11 LAB
AMPHETAMINES UR QL SCN: NEGATIVE
BARBITURATES UR QL: NEGATIVE
BENZODIAZ UR QL: NEGATIVE
CANNABINOIDS UR QL SCN: NEGATIVE
COCAINE UR QL: NEGATIVE
ETHANOL UR QL SCN: NEGATIVE
HCG UR QL: NEGATIVE
OPIATES UR QL SCN: NEGATIVE

## 2019-10-11 PROCEDURE — 99232 SBSQ HOSP IP/OBS MODERATE 35: CPT | Performed by: PSYCHIATRY & NEUROLOGY

## 2019-10-11 PROCEDURE — 12400001 ZZH R&B MH UMMC

## 2019-10-11 PROCEDURE — 25000132 ZZH RX MED GY IP 250 OP 250 PS 637: Performed by: PSYCHIATRY & NEUROLOGY

## 2019-10-11 PROCEDURE — 25000128 H RX IP 250 OP 636: Performed by: PSYCHIATRY & NEUROLOGY

## 2019-10-11 PROCEDURE — H2032 ACTIVITY THERAPY, PER 15 MIN: HCPCS

## 2019-10-11 PROCEDURE — 90853 GROUP PSYCHOTHERAPY: CPT

## 2019-10-11 PROCEDURE — 80307 DRUG TEST PRSMV CHEM ANLYZR: CPT | Performed by: FAMILY MEDICINE

## 2019-10-11 PROCEDURE — G0177 OPPS/PHP; TRAIN & EDUC SERV: HCPCS

## 2019-10-11 PROCEDURE — 81025 URINE PREGNANCY TEST: CPT | Performed by: FAMILY MEDICINE

## 2019-10-11 PROCEDURE — 80320 DRUG SCREEN QUANTALCOHOLS: CPT | Performed by: FAMILY MEDICINE

## 2019-10-11 RX ORDER — RISPERIDONE 1 MG/1
1 TABLET ORAL AT BEDTIME
Status: COMPLETED | OUTPATIENT
Start: 2019-10-11 | End: 2019-10-12

## 2019-10-11 RX ORDER — RISPERIDONE 2 MG/1
2 TABLET ORAL AT BEDTIME
Status: DISCONTINUED | OUTPATIENT
Start: 2019-10-13 | End: 2019-10-14 | Stop reason: HOSPADM

## 2019-10-11 RX ADMIN — RISPERIDONE 25 MG: KIT at 13:04

## 2019-10-11 RX ADMIN — BUPRENORPHINE HYDROCHLORIDE, NALOXONE HYDROCHLORIDE 3 FILM: 4; 1 FILM, SOLUBLE BUCCAL; SUBLINGUAL at 08:47

## 2019-10-11 RX ADMIN — RISPERIDONE 1 MG: 1 TABLET ORAL at 22:19

## 2019-10-11 ASSESSMENT — ACTIVITIES OF DAILY LIVING (ADL)
LAUNDRY: WITH SUPERVISION
HYGIENE/GROOMING: INDEPENDENT
ORAL_HYGIENE: INDEPENDENT
DRESS: INDEPENDENT

## 2019-10-11 NOTE — PROGRESS NOTES
Personal Plan of Care--  Reasons for hospitalization:  1. Psychotic  2. Need med changes  Plans for discharge:  1. Medications working well  2. Mental health symptoms stabilized  3. Outpatient  Appointments in place.

## 2019-10-11 NOTE — PLAN OF CARE
Groups Attended: OT Mental Health Management, Clinic     Affect/Hygiene/Presentation: Calm mood, engaged, congruent affect. No apparent hygiene concerns.     Patient Performance/Response:  -Mental Health Management: Pt actively participated in a mental health management group with a topic of goal setting. Pt contributed at least one idea to a discussion about the importance of goal-setting and components of a good goal. Pt identified a long-term goal as well as 5 short-term goals needed in order to achieve the long-term goal. Pt appeared comfortable sharing goals and ideas with group members.   -Clinic: Pt actively participated in occupational therapy clinic. Pt was able to ask for assistance as needed, and independently initiate a familiar, creative expression task without difficulty. Pt demonstrated good focus, planning, and attention to detail during task completion. Pt appeared comfortable interacting with peers and writer, however generally kept to herself during group.     Plan: Pt will be encouraged to maintain group attendance for continued ongoing assessment and support in completion of occupational therapy treatment goals.

## 2019-10-11 NOTE — PROGRESS NOTES
Pt was lower energy but engaged in rhythmic group movement.  She became more engaegd throughout the session with gentle movements.       10/11/19 1330   Dance Movement Therapy   Type of Intervention structured groups   Response participates with encouragement   Hours 1

## 2019-10-11 NOTE — PROGRESS NOTES
"[facility], [place]   Psychiatric Progress Note        Interim History:   The patient's care was discussed with the treatment team during the daily team meeting and/or staff's chart notes were reviewed.  Staff report patient continues to be visible in the milieu attending some groups and taking all medications as prescribed. Denies SI, SIB, hallucinations and delusions. Eating and drinking without difficulty. No acute events overnight.     The patient was updated on writer taking over her care for remainder of hospital stay. She feels that she has improved since admission and states she was \"really out of it\" and not doing well before coming into the hospital. She has noted her sleep has improved greatly and that she is less confused. She denies having any AH or VH so far today and so this has continued to improve as well. She does continue to mention \"spirits\" and discussed that if some of her symptoms were real and part of her beliefs or Christian and not part of her mental health then the medications would not change them. She agreed with this idea but still mentioned not being sure she has a mental health issue. She denied having any paranoia and denied safety concerns including thoughts of harming self or others.She is tolerating the risperidone, noting that she feels a little tired during the day but also believes this may be due to boredom. She is still agreeable to receiving injection of Consta today and understands she will need to continue oral medications for several weeks even with getting the injection. She is agreeable to increasing oral dose to 2mg on Sunday night as she previously did well on 3mg and this may be target dose before discharge but wanting to increase slowly to help minimize side effects. Discussed that if she continues to tolerate medications and improve may anticipate discharge early next week.     Patients  Wilman requested call from writer. Provided " "Wilman with update on taking over Erma's care. He reports he feels she is improving and appear \"more lucid\" today in their conversations. She has expressed wanting to discharge from the hospital soon and he is in agreement with wanting to make sure she is doing well a few more days and tolerating the medications prior to returning home. Discussed plan for her to receive injection of risperidone ROBERTS today and that she will receive this every 2 weeks and should continue her oral medication until after her 3rd injection or 6 weeks and can work with her outpatient provider to discontinue the oral dose. He discussed some warning signs he now knows to monitor for including his wife having poor sleep prior to decompensation. Discussed importance of consistent sleep schedule with maintaining stability going forward. He thanked writer for update and denied having any other questions or concerns at this time.          Medications:       buprenorphine HCl-naloxone HCl  3 Film Sublingual Daily     risperiDONE  1 mg Oral Daily     risperiDONE microspheres ER  25 mg Intramuscular Q14 Days          Allergies:     Allergies   Allergen Reactions     Phenergan Dm Other (See Comments)     agitation          Labs:     No results found for this or any previous visit (from the past 24 hour(s)).       Psychiatric Examination:     BP 99/61   Pulse 72   Temp 97.9  F (36.6  C) (Oral)   Resp 16   Ht 1.651 m (5' 5\")   Wt 65.6 kg (144 lb 11.2 oz)   LMP 10/01/2019   SpO2 97%   BMI 24.08 kg/m    Weight is 144 lbs 11.2 oz  Body mass index is 24.08 kg/m .  Orthostatic Vitals       Most Recent      Sitting Orthostatic /70 10/10 0900    Sitting Orthostatic Pulse (bpm) 89 10/10 0900    Standing Orthostatic /74 10/10 0900    Standing Orthostatic Pulse (bpm) 106 10/10 0900        Appearance: awake, alert and adequately groomed  Attitude:  cooperative  Eye Contact:  good  Mood:  \"bored\"  Affect:  less labile, mood " congruent  Speech:  clear, coherent  Psychomotor Behavior:  no evidence of tardive dyskinesia, dystonia, or tics  Thought Process:  more logical, will still make some illogical statements; more organized  Associations:  no loose associations  Thought Content:  no evidence of suicidal ideation or homicidal ideation and denies having any AH or VH today  Insight:  partial  Judgement:  fair  Oriented to:  time, person, and place  Attention Span and Concentration:  intact  Recent and Remote Memory:  fair    Clinical Global Impressions  First:  Considering your total clinical experience with this particular patient population, how severe are the patient's symptoms at this time?: 7 (10/09/19 0603)  Compared to the patient's condition at the START of treatment, this patient's condition is:: 4 (10/09/19 0603)  Most recent:  Considering your total clinical experience with this particular patient population, how severe are the patient's symptoms at this time?: 7  Compared to the patient's condition at the START of treatment, this patient's condition is:: 3          Precautions:     Behavioral Orders   Procedures     Code 1 - Restrict to Unit     Routine Programming     As clinically indicated     Status 15     Every 15 minutes.     Suicide precautions     Patients on Suicide Precautions should have a Combination Diet ordered that includes a Diet selection(s) AND a Behavioral Tray selection for Safe Tray - with utensils, or Safe Tray - NO utensils            Diagnoses:     Schizoaffective disorder, bipolar type  Opiate use disorder on Suboxone maintenance         Plan:     1) Continue Risperdal 1mg at bedtime for 2 more days  2) Increase Risperdal to 2mg at bedtime starting 10/13/19   3) Will first dose of Risperdal Consta 25mg today  4) Of note, both patient and pharmacy report patient has been off Lithium with last refill being in June (see PharmD note 10/8) and patient reports she stopped 2/2 nausea, however recent Li level  on admit showed therapeutic at 1.01;patient stated again today she has not been taking Lithium and is not interested in restarting medication, will plan to monitor for any decompensation and have low threshold to resume medication or add alternative mood stabilizer if needed      Disposition Plan   Reason for ongoing admission: is unable to care for self due to severe psychosis or earlene  Discharge location: home with family  Discharge Medications: not ordered  Follow-up Appointments: not scheduled  Legal Status: voluntary  Entered by: Judith Dupree on 10/11/2019 at 9:09 AM

## 2019-10-11 NOTE — PROGRESS NOTES
10/11/19 1500   Psycho Education   Type of Intervention structured groups   Response participates, initiates socially appropriate   Hours 1   Treatment Detail Identifying Triggers   CTC lead group on subject matter and encouraged patient to participate.  She reports improved mood, identified delusion and her trigger is poor sleep.

## 2019-10-11 NOTE — PROGRESS NOTES
"   10/10/19 2059   Behavioral Health   Hallucinations denies / not responding to hallucinations   Thinking distractable;delusional   Orientation person: oriented;place: oriented;date: oriented;time: oriented   Insight insight appropriate to situation   Judgement impaired   Eye Contact at examiner   Affect blunted, flat   Mood mood is calm   Physical Appearance/Attire attire appropriate to age and situation   Hygiene other (see comment)  (Adequate)   Suicidality   (Denies)   1. Wish to be Dead (Past Month) No   2. Non-Specific Active Suicidal Thoughts (Past Month) No   Self Injury   (Denies)   Elopement Statements about wanting to leave   Activity   (Attended group, socialized some with peers)   Speech clear;coherent   Psychomotor / Gait balanced;steady   Activities of Daily Living   Hygiene/Grooming independent   Oral Hygiene independent   Dress scrubs (behavioral health);independent   Laundry with supervision   Room Organization independent     Pt was visible in the milieu for most of the shift. Her affect was blunted, her mood was calm. Pt attended some of the OT group and spent the rest of the shift either on the phone or watching TV with peers. Pt denied SI, SIB, hallucinations, and depression. She endorsed some anxiety, she rated it 1/10 and stated \"I'm anxious only because I'm here and I don't really know what the plan is\". Pt stated concentration was \"fine\". Pt reported feeling irritable today and stated \"I just don't want to be here, I get why I'm here but at the same time I don't\". Pt went on to talk about her house being haunted by a ghost/spirit and \"I really need to get that taken care of. I have been praying and going to [employer] and I feel like my [place] is the only thing that is helping me right now\". Pt stated she would like to see a . Pt reported appetite was \"fine\" and sleep was poor she stated \"I tossed and turned all night. But I can go 2 or 3 days with out sleeping. I would really like " "to get my sleep figured out\". Pt had one visitor this evening and she said the visit went well. Overall pt had an ok shift, there were no major concerns.   "

## 2019-10-11 NOTE — PROGRESS NOTES
"SPIRITUAL HEALTH SERVICES  SPIRITUAL ASSESSMENT Progress Note  [facility] ([facility] [place]) 10NB     REFERRAL SOURCE: Responded to a  request    In this visit, Erma and I spoke about her hospitalization and her [employer] narrative. Erma stated her belief that what is happening to her is \"spiritual warfare\" and that she doesn't know why these things are happening to her.     She spoke about prayer and her belief in God as supportive so we reflected together on how prayer is supportive to her. She noted that she is \"new to Sikhism and hasn't read much of the bible.\" At one point she noted, \"I am just frustrated because people keep telling me this is all mental health stuff but I know it is spiritual warfare. Prayer is the only thing that helps me.\" I validated her frustration and worked with her to identify ways that God is supporting her through her medication and through the hospitalization as well as through prayer. Identifying together ways that mental health support is necessary to support spiritual health.  She expressed that thinking about God's presence in the care she is provided \"could be helpful\" and she noted \"I will have to keep thinking about that.\"    We also discussed a breathing technique centered on prayer in which she could \"breathe in God's presence and Breathe out that which burdens her.\" Erma noted that this sounded very supportive to her needs at this time.     PLAN: I will inform the unit  of our visit and Erma's desire for further support.     Emir Al, [facility], DMin  Staff   Pager [phone]    "

## 2019-10-11 NOTE — PROGRESS NOTES
"Pt has been attending groups and is eating meals and showered. Pt said she is doing \"better\" but other than that she declines a formal 1:1. Pt did deny SI or SIB. Pt does not really talk much with others but does spend time in the lounge.     10/11/19 1224   Sleep/Rest/Relaxation   Sleep/Rest/Relaxation no problem identified   Behavioral Health   Hallucinations denies / not responding to hallucinations   Thinking distractable;confused;paranoid   Orientation time: oriented;date: oriented;person: oriented;place: oriented   Memory baseline memory   Insight poor   Judgement impaired   Eye Contact into space;out of corner of eyes   Affect blunted, flat   Mood anxious;irritable   Physical Appearance/Attire attire appropriate to age and situation   Hygiene well groomed   Suicidality other (see comments)  (denies)   Self Injury other (see comment)  (denies)   Elopement Statements about wanting to leave   Activity restless   Speech rambling;pressured   Psychomotor / Gait balanced;steady   Coping/Psychosocial   Verbalized Emotional State powerlessness;loneliness;frustration;disbelief   Behavioral Health Interventions   Social and Therapeutic Interventions (Psychotic Symptoms) encourage socialization with peers;encourage participation in therapeutic groups and milieu activities;encourage effective boundaries with peers     "

## 2019-10-12 PROCEDURE — 25000132 ZZH RX MED GY IP 250 OP 250 PS 637: Performed by: PSYCHIATRY & NEUROLOGY

## 2019-10-12 PROCEDURE — H2032 ACTIVITY THERAPY, PER 15 MIN: HCPCS

## 2019-10-12 PROCEDURE — 12400001 ZZH R&B MH UMMC

## 2019-10-12 RX ADMIN — RISPERIDONE 1 MG: 1 TABLET ORAL at 21:02

## 2019-10-12 RX ADMIN — BUPRENORPHINE HYDROCHLORIDE, NALOXONE HYDROCHLORIDE 3 FILM: 4; 1 FILM, SOLUBLE BUCCAL; SUBLINGUAL at 08:55

## 2019-10-12 ASSESSMENT — ACTIVITIES OF DAILY LIVING (ADL)
HYGIENE/GROOMING: INDEPENDENT
LAUNDRY: UNABLE TO COMPLETE
DRESS: INDEPENDENT
ORAL_HYGIENE: INDEPENDENT

## 2019-10-12 NOTE — PLAN OF CARE
"48 hour assessment    Admission date 10/8/2019 .    Admitting dx:Suicidal ideation [R45.851]  Homicidal ideation [R45.850]  Hearing voices [R44.0]     /71   Pulse 72   Temp 98.5  F (36.9  C) (Tympanic)   Resp 16   Ht 1.651 m (5' 5\")   Wt 65.6 kg (144 lb 11.2 oz)   LMP 10/01/2019   SpO2 96%   BMI 24.08 kg/m         Pt has been in the milieu most of the shift. Social with select peers and staff. Pt is mostly reading in the lounge. Pt's goal was to take a shower which she has done. Well groomed. Pt hopes to discharge back to her house with her  on Monday. Participated in community meeting. Denies depression and rates anxiety as 1/10 with 10 being high. Denies psychotic symptoms. Concentration is \"good\" and denies racing thoughts. Denies SI and SIB. \"yes\" to being hopeful. Appetite and sleep are good. Denies pain and side effects. Medication complaint. Ate breakfast and lunch. Full range affect. Calm mood. Intact judgement.   "

## 2019-10-12 NOTE — PROGRESS NOTES
10/12/19 1800   Therapeutic Recreation   Type of Intervention structured groups   Activity game   Response Participates with encouragement   Hours 1     Pt participated in Therapeutic Recreation group with focus on stress reduction, creative expression, and leisure participation. Engaged and cooperative in a  recreational activity via a group drawing game. Pt was hesitant to take a turn, but with some encouragement, participated throughout entire duration of the group. Pt added to group discussion, sociable, and was appropriate with interactions. Showed progress in session goals. Pt mood was calm. Appropriately shared sense of humor with peers during group and appeared to brighten with social interaction.

## 2019-10-12 NOTE — PROGRESS NOTES
"   10/11/19 1600   Group Therapy Session   Group Attendance attended group session   Total Time (minutes) 40   Group Type psychotherapeutic   Group Topic Covered problem solving;coping skills/lifestyle management   Patient Participation/Contribution cooperative with task;disorganized   CBT activity was utilized to help individuals identify and process a problem while considering various coping strategies.  Erma presented with blunted mood/affect. She engaged in the activity and shared her responses. Some responses were difficult to hear as she mumbled her words. She noted that her problem was regarding her  \"relationship.'  Her response to \"what gives you hope that you can resolve problem,\" she stated something related to \"warfare.\" Another comment included the word \"bondage\" though not clear how it fit. This writer did not feel it was appropriate to ask for clarification during group. She quickly read through her responses. Coping skills were related to her belief in God and prayer.  "

## 2019-10-12 NOTE — PROGRESS NOTES
"   Pt was visible in the lounge and spent the shift either on the phone or watching TV with peers. Pt denied SI, SIB, hallucinations, and depression. She endorsed some anxiety (1) and stated \"I'm anxious only because I'm here and I don't really know what the plan is\". Pt stated her concentration in fine. She stated that she knows why she's here but doesn't want to be.  Eating fine and states she never sleeps very good.   Overall pt had an ok shift, there were no major concerns.      10/11/19 2100   Behavioral Health   Hallucinations denies / not responding to hallucinations   Memory baseline memory   Eye Contact into space;at examiner   Affect full range affect   Mood mood is calm   Physical Appearance/Attire attire appropriate to age and situation   Hygiene well groomed   Suicidality other (see comments)  (denies')   1. Wish to be Dead (Past Month) No   Self Injury other (see comment)  (denies)   Activities of Daily Living   Hygiene/Grooming independent   Oral Hygiene independent   Dress independent   Laundry with supervision   Room Organization independent             "

## 2019-10-13 PROCEDURE — 25000132 ZZH RX MED GY IP 250 OP 250 PS 637: Performed by: PSYCHIATRY & NEUROLOGY

## 2019-10-13 PROCEDURE — 12400001 ZZH R&B MH UMMC

## 2019-10-13 RX ADMIN — BUPRENORPHINE HYDROCHLORIDE, NALOXONE HYDROCHLORIDE 3 FILM: 4; 1 FILM, SOLUBLE BUCCAL; SUBLINGUAL at 07:53

## 2019-10-13 RX ADMIN — RISPERIDONE 2 MG: 2 TABLET ORAL at 20:13

## 2019-10-13 ASSESSMENT — ACTIVITIES OF DAILY LIVING (ADL)
LAUNDRY: WITH SUPERVISION
ORAL_HYGIENE: INDEPENDENT
DRESS: INDEPENDENT
HYGIENE/GROOMING: INDEPENDENT
HYGIENE/GROOMING: INDEPENDENT
ORAL_HYGIENE: INDEPENDENT
DRESS: INDEPENDENT

## 2019-10-13 ASSESSMENT — MIFFLIN-ST. JEOR: SCORE: 1351.28

## 2019-10-13 NOTE — PROGRESS NOTES
10/12/19 2220   Behavioral Health   Hallucinations denies / not responding to hallucinations   Thinking distractable;poor concentration   Orientation person: oriented;place: oriented;date: oriented   Memory baseline memory   Insight admits / accepts;poor   Judgement impaired   Eye Contact at examiner   Affect blunted, flat   Mood mood is calm   Physical Appearance/Attire disheveled   Hygiene well groomed   Suicidality   (Pt denies SI thoughts)   1. Wish to be Dead (Past Month) No   2. Non-Specific Active Suicidal Thoughts (Past Month) No   Self Injury   (Pt denies SIB thought)   Elopement   (No risk of elopement observed)   Activity   (Pt socialized and visible in the milieu)   Speech clear;coherent   Medication Sensitivity no stated side effects;no observed side effects   Psychomotor / Gait balanced;steady   Psycho Education   Type of Intervention 1:1 intervention   Response participates, initiates socially appropriate   Hours 0.5   Activities of Daily Living   Hygiene/Grooming independent   Oral Hygiene independent   Dress independent   Laundry unable to complete   Room Organization independent   Activity   Activity Assistance Provided independent   Behavioral Health Interventions   Social and Therapeutic Interventions (Psychotic Symptoms) encourage socialization with peers;encourage effective boundaries with peers;encourage participation in therapeutic groups and milieu activities   Pt indicated havng a good day and looking forward to discharge next week  Pt denies SI/SIB  and denies visual or auditory hallucination  Pt indicated good appetite, ate dinner , good sleep  Pt socialized and was visible in the milieu  Pt ate dinner and took her medications

## 2019-10-13 NOTE — PROGRESS NOTES
Pt is hoping to discharge back home tomorrow. States her  can pick her up. Pt is aware her Risperdal will be increased tonight. Calm and polite.

## 2019-10-13 NOTE — PLAN OF CARE
Patient appeared calm and social in the milieu. She reports improved mood, decreased anxiety and depression. Pt reports feeling hopeful and is hoping to discharge tomorrow.

## 2019-10-14 VITALS
SYSTOLIC BLOOD PRESSURE: 114 MMHG | OXYGEN SATURATION: 100 % | RESPIRATION RATE: 16 BRPM | HEIGHT: 65 IN | HEART RATE: 77 BPM | WEIGHT: 150 LBS | DIASTOLIC BLOOD PRESSURE: 74 MMHG | BODY MASS INDEX: 24.99 KG/M2 | TEMPERATURE: 97.2 F

## 2019-10-14 PROCEDURE — H2032 ACTIVITY THERAPY, PER 15 MIN: HCPCS

## 2019-10-14 PROCEDURE — 25000132 ZZH RX MED GY IP 250 OP 250 PS 637: Performed by: PSYCHIATRY & NEUROLOGY

## 2019-10-14 PROCEDURE — 99239 HOSP IP/OBS DSCHRG MGMT >30: CPT | Performed by: PSYCHIATRY & NEUROLOGY

## 2019-10-14 RX ORDER — BUPRENORPHINE AND NALOXONE 4; 1 MG/1; MG/1
3 FILM, SOLUBLE BUCCAL; SUBLINGUAL DAILY
COMMUNITY
Start: 2019-10-15 | End: 2022-12-01

## 2019-10-14 RX ORDER — RISPERIDONE 25 MG/2 ML
25 KIT INTRAMUSCULAR
Qty: 2 EACH | Refills: 0 | Status: SHIPPED | OUTPATIENT
Start: 2019-10-25 | End: 2022-12-20

## 2019-10-14 RX ORDER — RISPERIDONE 2 MG/1
2 TABLET ORAL AT BEDTIME
Qty: 30 TABLET | Refills: 0 | Status: SHIPPED | OUTPATIENT
Start: 2019-10-14 | End: 2022-12-19

## 2019-10-14 RX ADMIN — BUPRENORPHINE HYDROCHLORIDE, NALOXONE HYDROCHLORIDE 3 FILM: 4; 1 FILM, SOLUBLE BUCCAL; SUBLINGUAL at 08:38

## 2019-10-14 NOTE — PROGRESS NOTES
The patient is being discharged home today accompanied by her .  She has upcoming appointments with Suboxone Provider Dr. Vicente Vaughn on 10/15 at 10am at [facility] and Psychiatric Provider on Wed October 16 at 2pm for psychiatric medicaiton management.  Discharge medications sent to [facility] in [place], [place]

## 2019-10-14 NOTE — PLAN OF CARE
Pt was escorted off the unit to . Pt verbalized readiness for discharge. Pt verbalized understanding of discharge plan including discharge medications and follow up appointments. Pt has appointment at 10 am and will get her subutex tomorrow from them.  Pt denies SI and SIB. Pt left with personal belongings. . Pt states her  and family are very supportive and pt has several coping skills to use.  Full range affect. Calm mood.  Ate 100% breakfast. Medication compliant. Pt has guns at home that are locked and will remain locked and pt's  will move the key until he can move the guns to his mothers house. Pt does not want access to guns and wants them moved if they are not already moved.

## 2019-10-14 NOTE — PROVIDER NOTIFICATION
Pt had a good shift, calm and cooperative and social with peers. Looking forward to discharge hopefully tomorrow. Pt denies SI/SIB  and denies visual or auditory hallucinations.  Eating and sleeping fine.  No further input at this time.     10/13/19 2100   Behavioral Health   Hallucinations denies / not responding to hallucinations   Orientation time: oriented;date: oriented;place: oriented;person: oriented   Memory baseline memory   Insight admits / accepts   Eye Contact at examiner   Affect full range affect   Mood mood is calm   Hygiene well groomed   Suicidality other (see comments)  (denies)   1. Wish to be Dead (Past Month) No   Self Injury other (see comment)  (denies)   Activities of Daily Living   Hygiene/Grooming independent   Oral Hygiene independent   Dress independent   Laundry with supervision

## 2019-10-14 NOTE — PROGRESS NOTES
10/14/19 1500    Art Therapy   Type of Intervention structured groups   Response participates with encouragement   Hours 1.5   Treatment Detail    (Art Therapy: healthy coping, daily routine directives)   Problem-Schizoaffective disorder, bipolar type  Opiate use disorder on Suboxone maintenance        Goal-[place], express, regulate and sublimate emotions through Art Therapy directives     Outcome-Pt was in and out of group. She was anxious for discharge today. She did do some crafts/ quick ideas about coping skills and selected music for group.

## 2019-10-14 NOTE — DISCHARGE INSTRUCTIONS
Behavioral Discharge Planning and Instructions      Summary:  You were admitted on 10/8/2019  due to increasing psychotic symptoms.  You were treated by Dr. Judith Dupree and discharged on 10/14/19 from [facility] to your home in [place], [place] accompanied by your .      Principal Diagnosis:   Schizoaffective Disorder, Bipolar Type  Opiate Use Disorder on Suboxone Maintenance    Appointments:  PCP Dr. Vicente Vaughn (Suboxone Maintenance) - Next Appointment on October 15th at 10am  [facility]  [address] [place] [address] [phone]  FAX: [phone]    Psychiatric Provider NP Estefanía Watts - Next Appointment on Wednesday, October 16th at 2pm  [facility] (Not open on Fridays)  [address]  [place]  [address] [phone]  [phone]        Attend all scheduled appointments with your outpatient providers. Call at least 24 hours in advance if you need to reschedule an appointment to ensure continued access to your outpatient providers.   Major Treatments, Procedures and Findings:  You were provided with: a psychiatric assessment, assessed for medical stability, medication evaluation and/or management, group therapy and milieu management    Symptoms to Report: increased confusion, losing more sleep or mood getting worse    Early warning signs can include: increased depression or anxiety sleep disturbances increased unusual thinking, such as paranoia or hearing voices    Safety and Wellness:  Take all medicines as directed.  Make no changes unless your doctor suggests them.  Follow treatment recommendations.  Refrain from alcohol and non-prescribed drugs.  If there is a concern for safety, call 911.    Resources:   Crisis Intervention: 490.868.5907 or 520-019-3176 (TTY: 304.411.3647).  Call anytime for help.  [employer] [place] (www.[place].shamir.org): 647.363.9255 or 967-264-7906.      The treatment team  has appreciated the opportunity to work with you.     If you have any questions or concerns our unit number is [phone]

## 2019-10-14 NOTE — DISCHARGE SUMMARY
"Psychiatric Discharge Summary    Erma Gregory MRN# [id number]   Age: 38 year old YOB: 1980     Date of Admission:  10/8/2019  Date of Discharge:  10/14/2019  Admitting Physician:  Chuck Piedra MD  Discharge Physician:  Judith Dupree MD          Event Leading to Hospitalization:   The patient is a 37yo female with a history of schizoaffective disorder who was admitted after endorsing command auditory hallucinations to kill herself and others. Today, the patient reports that she was \"really out of it.\" Says that she just had a bad night and is now denying SI or HI. When asked about AH, she reiterates that she was \"really out of it.\" Denies VH. Says that she was taking her Suboxone and Risperdal and then admits that she wasn't. Says she is trying to wean herself off Suboxone and is down to 12mg daily.      Did attempt to reach her  Wilman at [phone] and left voicemail. Records indicate that he would support commitment due to patient's non adherence to medications and bizarre behavior.       Per ER: Erma Gregory is a 38 year old female who has a past medical history of schizophrenia disorder presenting with bizarre behavior according to the .  She is suicidal with plan to shoot herself.  She also has thoughts about hurting others, she says she wants to hurt others but does not have a specific person.  She says she is hearing voices but that they are saying nothing.  No hallucinations.  Denies drugs or alcohol use.  She says been she has been taking her medications however she denied this to our behavioral .  She denies any physical discomfort.       See Admission note by Dr. Piedra on 10/9/19 for additional details.          Diagnoses:     Schizoaffective disorder, bipolar type  Opiate use disorder on Suboxone maintenance         Labs:   10/9/19: Lithium level 1.01  10/10/19: CMP with low total protein 6.6 otherwise all other values WNL; TSH 1.53; CBC " "WNL  10/11/19: Urine HCG negative; Utox negative          Consults:   No consultations were requested during this admission         Hospital Course:   Erma Gregory was admitted to [facility] with attending Dr. Piedra as a voluntary patient. The patient was placed under status 15 (15 minute checks) to ensure patient safety.   CBC, BMP and utox obtained.  Patient  did not require seclusion or administration of emergency medications to manage behavior.    She had not been taking outpatient medications of risperidone and lithium per her report, was restarted on Risperidone but declined restarting [place] 2/2 GI issues. She tolerated oral dose of Risperdal at 2mg with some mild sedation noted and received first IM of Consta 25mg on 10/11/19. Discussed with patient and  that oral dose may need to be increased further as outpatient should she continue to have psychosis symptoms given last hospitalization she was discharged on 3 mg and that she should continue oral medication for 3 weeks after first Consta injection and then can work with outpatient provider to decrease dose. Of note, inpatient team did not discharge Erma with new subxone dose as she has appointment with outpatient suboxone provider tomorrow morning and can receive new prescription at that time. Also, contacted outpatient psychiatry clinic who reported they do not perform injections for Consta at their clinic. Advised patient of this and she plans to speak with her provider at her appointment on Wednesday to develop a plan for injection going forward.     Erma Gregory did participate in groups and was visible in the milieu.     The patient's symptoms of hallucinations, disorganization, SI and HI improved. She continued to have some delusional Bahai beliefs that her psychotic symptoms are part of \"Spritual warfare\" but insight was improving during hospital stay.     Erma Gregory was discharged to home. At the time of discharge Erma Gregory " "was determined to not be a danger to herself or others.          Discharge Medications:     Discharge Medication List as of 10/14/2019 12:54 PM      START taking these medications    Details   buprenorphine HCl-naloxone HCl (SUBOXONE) 4-1 MG per film Place 3 Film under the tongue daily, HistoricalPatient not given script as has appointment with outpatient suboxone prescriber tomorrow morning 10/15/19      risperiDONE microspheres ER (RISPERDAL CONSTA) 25 MG injection Inject 2 mLs (25 mg) into the muscle every 14 days, Disp-2 each, R-0, E-PrescribePatient received first injection on 10/11/19, next injection due 10/25/19         CONTINUE these medications which have CHANGED    Details   risperiDONE (RISPERDAL) 2 MG tablet Take 1 tablet (2 mg) by mouth At Bedtime, Disp-30 tablet, R-0, E-Prescribe         CONTINUE these medications which have NOT CHANGED    Details   etonogestrel (IMPLANON/NEXPLANON) 68 MG IMPL 1 each by Subdermal route once, Historical         STOP taking these medications       buprenorphine HCl-naloxone HCl (SUBOXONE) 8-2 MG per film Comments:   Reason for Stopping:                       Psychiatric Examination:   Appearance:  awake, alert and well groomed  Attitude:  cooperative  Eye Contact:  good  Mood:  \"good\"  Affect:  appropriate and in normal range and mood congruent  Speech:  clear, coherent  Psychomotor Behavior:  no evidence of tardive dyskinesia, dystonia, or tics  Thought Process:  logical, linear and goal oriented  Associations:  no loose associations  Thought Content:  no evidence of suicidal ideation or homicidal ideation, still having some thoughts of spiritual warefare last night but has not had any further thoughts today  Insight:  fair  Judgment:  fair  Oriented to:  time, person, and place  Attention Span and Concentration:  intact  Recent and Remote Memory:  intact  Language: English, fluent  Fund of Knowledge: appropriate  Muscle Strength and Tone: normal  Gait and Station: " Normal         Discharge Plan:   PCP Dr. Vicente Vaughn (Suboxone Maintenance) - Next Appointment on October 15th at 10am  [facility]  [address] [place] [address] [phone]  FAX: [phone]     Psychiatric Provider NP Estefanía Watts - Next Appointment on Wednesday, October 16th at 2pm  [facility] (Not open on Fridays)  [address]  [place], [place]  [id number] [phone]  [phone]    Attestation:  The patient has been seen and evaluated by me,  Judith Dupree MD on day of discharge. 45 minutes was spent in discharge planning.

## 2019-10-24 ENCOUNTER — TELEPHONE (OUTPATIENT)
Dept: FAMILY MEDICINE | Facility: OTHER | Age: 39
End: 2019-10-24

## 2019-10-24 NOTE — TELEPHONE ENCOUNTER
Pt was admitted to [facility] behavior [facility] 10/8/19-10/14/19.  Patient is scheduled for an RN only visit tomorrow for Risperdal injection teaching. Unsure of her expectations for this visit.   - Risperdal IM requires reconstitution.   - patient's Risperdal script was sent to [facility].  - unsure of what [facility] provided Risperdal will look like  - unsure of patient's current level of knowledge with IM injections is    Called patient to assess appropriateness of this visit, left message to call back.    - No consent to communicate on file to speak to spouse.  - Patient is not an established [facility] patient.  - Unclear if she has had her follow up appointments with her [facility] PCP on 10/15 or psychiatric provider with [facility] on 10/16 (these were scheduled at the time of hospital discharge).   - Unsure if she has already had some injection teaching at hospital discharge or at one of these appointments already.    Marisela Hammer, RN, BSN

## 2019-10-24 NOTE — TELEPHONE ENCOUNTER
Left pt another message asking her to callback. Transfer to triage. See message below    Next 5 appointments (look out 90 days)    Oct 25, 2019  9:00 AM CDT  Nurse Only with NL RN TEAM A, ERC  [facility] [address] [place] [phone]  [phone]        Yesi Padron, RN, BSN

## 2019-10-25 ENCOUNTER — ALLIED HEALTH/NURSE VISIT (OUTPATIENT)
Dept: FAMILY MEDICINE | Facility: OTHER | Age: 39
End: 2019-10-25
Payer: COMMERCIAL

## 2019-10-25 DIAGNOSIS — Z71.89 ENCOUNTER FOR INJECTION EDUCATION: Primary | ICD-10-CM

## 2019-10-25 PROCEDURE — 99207 ZZC NO CHARGE NURSE ONLY: CPT

## 2019-10-25 NOTE — PROGRESS NOTES
Patient arrived for nurse only visit for intramuscular injection teaching accompanied with her spouse.   They have 1 dose of their [facility]-dispensed Risperdal IM medication.  Patient accompanied by her spouse, who states they were expecting that their medication/injection would be administered today by clinic staff, and then every 2 weeks going forward.    Explained to patient/spouse we are unable to complete this injection or allow them to inject this medication at our facility, per [facility] [place] policy.  - Writer is able to provide intramuscular teaching, should they need to inject this medication at home  - pt/spouse verbalize their understanding of this and wish to obtain this training  - pt/spouse state they are comfortable self injecting at home  - spouse agrees to inject this into patient's deltoid as prescribed  - spouse also states they have been told by patient's phycology clinic that they will not agree to provide further care for her if she starts receiving Risperdal injections as outpatient    Provided how-to self administer intramuscular injection into upper arm handout.  Reviewed step by step process of completing an IM deltoid injection.  Simulated process using injection simulation pad and practice supplies.  Patient's spouse simulated all injection steps using practice supplies 1 time.  Spouse completed simulated demonstration properly on the first try. Additional teaching not needed.  Patient and spouse verbalized understanding of all steps, express comfort with completing this process at home, and deny further questions.    Reviewed patient's [facility]-dispensed Risperdal IM kit, which is prefilled with exact dose required, but requires constitution.  - Reviewed pieces to kit and step by step medication handout with photos of each step.  - Patient and spouse verbalized understanding of all steps, express comfort with completing this process at home, and deny further  questions.    Encouraged patient/spouse to contact patient's PCP at [facility] upon leaving this appointment to verify the plan for her Risperdal IM injections.  - instructed them to bring their medication home and refrigerate immediately until after verifying with PCP that this medication should be continued as out patient.  - urged importance of verifying this with PCP since they state she has not yet been seen for follow up by her current psychologist since discharge and expresses concern that they will not see her again if she is on this medication IM  - call [facility] if we can be of any further assistance.  - they verbalize understanding and agreement with this plan and have no further questions at this time.    Called patient's PCP, [facility], spoke to RAQUEL Mota. Explained this situation to her.   She verbalizes understanding that patient/spouse are needing guidance from them as to if they should do today's injection or future injections going forward.  Svetlana agrees to notify patient's PCP, Dr. Vaughn, and develop a plan. She will call our clinic if they require anything further.    Marisela Hammer RN, BSN  [employer]

## 2019-10-25 NOTE — TELEPHONE ENCOUNTER
Called patient again to determine her needs at today's injection teaching appointment, left message to call back.    Called patient's spouse, phone number on file reached voicemail for a woman, unable to leave message.    Marisela Hammer, RN, BSN

## 2020-07-23 ENCOUNTER — TELEPHONE (OUTPATIENT)
Dept: OBGYN | Facility: OTHER | Age: 40
End: 2020-07-23

## 2020-07-23 NOTE — TELEPHONE ENCOUNTER
Reason for Call:  Other Call Back     Detailed comments: Patient called and would like a call back, she is unsure when her Nexplanon birth control is due to come out. Please advise.    Phone Number Patient can be reached at: Home number on file [phone] (home)    Best Time: any    Can we leave a detailed message on this number? YES    Call taken on 7/23/2020 at 4:50 PM by Iggy Sands

## 2020-07-24 NOTE — TELEPHONE ENCOUNTER
Left message to call back women's.  Pt is is overdue to have Nexplanon removed and re-inserted as of 7-.  Pt can schedule whenever she is available, but will most likely need UPT.      Loreta Tran MA on 7/24/2020 at 8:04 AM

## 2020-07-28 NOTE — TELEPHONE ENCOUNTER
Called and spoke with pt.  Pt said she would call back to make an appointment.    Loreta Tran MA on 7/28/2020 at 11:53 AM

## 2021-01-15 ENCOUNTER — ALLIED HEALTH/NURSE VISIT (OUTPATIENT)
Dept: FAMILY MEDICINE | Facility: OTHER | Age: 41
End: 2021-01-15
Payer: COMMERCIAL

## 2021-01-15 DIAGNOSIS — Z11.1 SCREENING EXAMINATION FOR PULMONARY TUBERCULOSIS: Primary | ICD-10-CM

## 2021-01-15 PROCEDURE — 86580 TB INTRADERMAL TEST: CPT

## 2021-01-15 PROCEDURE — 99207 PR NO CHARGE NURSE ONLY: CPT

## 2021-01-18 ENCOUNTER — ALLIED HEALTH/NURSE VISIT (OUTPATIENT)
Dept: FAMILY MEDICINE | Facility: OTHER | Age: 41
End: 2021-01-18
Payer: COMMERCIAL

## 2021-01-18 DIAGNOSIS — Z11.1 SCREENING EXAMINATION FOR PULMONARY TUBERCULOSIS: Primary | ICD-10-CM

## 2021-01-18 LAB
PPDINDURATION: 0 MM (ref 0–5)
PPDREDNESS: 0 MM

## 2021-01-18 PROCEDURE — 99207 PR NO CHARGE NURSE ONLY: CPT

## 2021-01-18 NOTE — PROGRESS NOTES
Erma Gregory, female, 1980 has had a mantoux on 01/15/2021 at 208pm on her right forearm.      Mantoux result is NEGATIVE: Read by RN at 1:21 pm on 01/18/2021  Lab Results   Component Value Date    PPDREDNESS 0 01/18/2021    PPDINDURATIO 0 01/18/2021         Please contact me for questions or concerns.        Sincerely,    MONA WangN, RN, PHN  [facility] [place] [facility] [place]  January 18, 2021

## 2021-01-18 NOTE — LETTER
[facility]  [address]  [place] [address]  [phone]          1/18/2021          To Whom it May Concern:     Erma Gregory, female, 1980 has had a mantoux on 01/15/2021 at 208pm on her right forearm.      Mantoux result is NEGATIVE: Read by RN at 1:21 pm on 01/18/2021  Lab Results   Component Value Date    PPDREDNESS 0 01/18/2021    PPDINDURATIO 0 01/18/2021         Please contact me for questions or concerns.        Sincerely,    Bubba Lewis, BSN, RN, PHN  [facility] [place] [facility] [place]  January 18, 2021

## 2021-06-24 NOTE — TELEPHONE ENCOUNTER
"Triage call: No consent to communicate on file,  states \"I just need someone to listen to me\"     is calling and reports \"Patient is not lucid right now\". He reports that she has been making threats of physical violence towards him via text message. Text message was read to writer over the phone and included the following, \"I will fuck you up, lmfao, you just wait.\" and that she contacted a \"hitman\" to get him. Patient has been exhibiting delusional behavior. She was recently hospitalized and thinks that patients that she was hospitalized with were under covered doctors and was taking medical advice from them. She has also been saying to her  that she will be leaving him and running away with a doctor named \"Theodore\" and she will talk to him while sitting in their living room, shouting messages to him.   feels that this is related to medications that she was recently started on. He is also concerned that she hasn't had a bowel movement as she told him that she thinks there are cameras everywhere and that she cannot have a BM within out being caught on camera. He is concerned that if she goes in that she will just get sent home and will continue this behavior and continue threatening violence.     Advised caller to call 911 for emergency assistance with getting patient help. Advised that it is ok to call the police if he feels threatened with physical violence from him wife. Caller indicates that he will do so.     Tenisha Shabazz RN [facility] Triage/Med Refill 3/5/2019 3:29 PM    Reason for Disposition    Sounds like a life-threatening emergency to the triager    Protocols used: NO PROTOCOL AVAILABLE - SICK ADULT-A-[place]      "

## 2022-12-01 ENCOUNTER — TELEPHONE (OUTPATIENT)
Dept: FAMILY MEDICINE | Facility: CLINIC | Age: 42
End: 2022-12-01

## 2022-12-01 DIAGNOSIS — F11.90 OPIOID USE DISORDER: Primary | ICD-10-CM

## 2022-12-01 RX ORDER — QUETIAPINE FUMARATE 50 MG/1
TABLET, EXTENDED RELEASE ORAL
COMMUNITY
Start: 2022-08-25

## 2022-12-01 RX ORDER — BUPRENORPHINE AND NALOXONE 8; 2 MG/1; MG/1
1 FILM, SOLUBLE BUCCAL; SUBLINGUAL 3 TIMES DAILY
Qty: 45 FILM | Refills: 0 | Status: SHIPPED | OUTPATIENT
Start: 2022-12-06 | End: 2022-12-19

## 2022-12-01 RX ORDER — ROSUVASTATIN CALCIUM 5 MG/1
1 TABLET, COATED ORAL DAILY
COMMUNITY
Start: 2022-10-06 | End: 2023-10-02

## 2022-12-01 RX ORDER — BUPRENORPHINE AND NALOXONE 8; 2 MG/1; MG/1
FILM, SOLUBLE BUCCAL; SUBLINGUAL
COMMUNITY
Start: 2022-11-21 | End: 2022-12-01

## 2022-12-01 NOTE — TELEPHONE ENCOUNTER
I will refill Suboxone for 15d and meet with Erma as scheduled on 12/19/22. Thanks.     [place] prescription monitoring database reviewed today.

## 2022-12-01 NOTE — TELEPHONE ENCOUNTER
Patient calling to see Dr. Vaughn, has previously seen him at other clinic and wanting to follow him to this clinic. Central scheduling made a virtual appointment for her with Dr. Vaughn for 12/19/22.      Also needs a refill of her Buprenorphine HCL- Naloxone HCL (Suboxone) Dose: 8-2 MG  Sig: 3 films under tongue daily   Route: Sublingual     Has enough to last her until December 6th but doesn't have anything scheduled until December 19th.     Preferred pharmacy: [facility].    Regine Marvin RN  [facility]

## 2022-12-01 NOTE — TELEPHONE ENCOUNTER
LMOM notifying pt rx sent to get to appt and to call with questions.    Lucinda LICONA RN  [facility]

## 2022-12-19 ENCOUNTER — VIRTUAL VISIT (OUTPATIENT)
Dept: FAMILY MEDICINE | Facility: CLINIC | Age: 42
End: 2022-12-19
Payer: COMMERCIAL

## 2022-12-19 DIAGNOSIS — F29 PSYCHOSIS, UNSPECIFIED PSYCHOSIS TYPE (H): ICD-10-CM

## 2022-12-19 DIAGNOSIS — F41.8 DEPRESSION WITH ANXIETY: ICD-10-CM

## 2022-12-19 DIAGNOSIS — F17.200 NICOTINE DEPENDENCE, UNCOMPLICATED, UNSPECIFIED NICOTINE PRODUCT TYPE: ICD-10-CM

## 2022-12-19 DIAGNOSIS — E78.5 DYSLIPIDEMIA: ICD-10-CM

## 2022-12-19 DIAGNOSIS — F11.90 OPIOID USE DISORDER: Primary | ICD-10-CM

## 2022-12-19 PROCEDURE — 99204 OFFICE O/P NEW MOD 45 MIN: CPT | Mod: TEL | Performed by: FAMILY MEDICINE

## 2022-12-19 RX ORDER — NICOTINE 21 MG/24HR
1 PATCH, TRANSDERMAL 24 HOURS TRANSDERMAL EVERY 24 HOURS
Qty: 30 PATCH | Refills: 2 | Status: SHIPPED | OUTPATIENT
Start: 2022-12-19 | End: 2023-07-31

## 2022-12-19 RX ORDER — BUPRENORPHINE AND NALOXONE 8; 2 MG/1; MG/1
1 FILM, SOLUBLE BUCCAL; SUBLINGUAL 3 TIMES DAILY
Qty: 90 FILM | Refills: 0 | Status: SHIPPED | OUTPATIENT
Start: 2022-12-19 | End: 2023-01-10

## 2022-12-19 NOTE — PROGRESS NOTES
Assessment/Plan - Phone Appointment      Opioid use disorder.  Sustained remission.  Continue buprenorphine 24 mg/day.  [place] prescription monitoring database reviewed.  Plan for urine drug screen at next office visit.    Tobacco use disorder.  1/2 pack/day.  Patient and  plan to quit together in January.    Patient to start nicotine patch 21 mg/day at that time.    Dyslipidemia, poor adherence to statin.  Cause unclear.  Encouraged patient to try taking statin immediately before her buprenorphine dose.    Psychosis NOS.  Patient appears to be doing well from a mental health perspective.  Continue current mental health medications.  Follow-up with psychiatry at [facility].    Cannabis use.  I again reviewed with patient that cannabis may exacerbate mental health symptoms.  I encouraged her to be mindful of this.    Follow-up in 1 month.    ----  Chief complaint: Recheck Medication    Social History     Social History Narrative    Lives with .  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     Patient reports that she is doing well overall.  One of her clients recently passed away.  This was upsetting.  Patient reports that her daughter's wedding went well.    Her  is doing well.   patient is working at the assisted living facility.  She is unsure whether she will switch to a full-time position there or will try to find a new independent client.    Patient denies medication changes since last visit.  She continues intramuscular risperidone every 2 weeks.  She also takes Seroquel for sleep once every few months.    From a mental health perspective, patient reports that this is the most stable she has been within the past few years.    She continues Suboxone 3 films per day.  She denies adverse effects.  She denies alcohol or illicit drug use since last visit, with the exception of cannabis.  She is using edible cannabis 2-3 times per week.  She feels this helps with  relaxation and appetite.  She denies concerns regarding cannabis use.    She is smoking about 1/2 pack/day.  She was previously smoking 1 pack/day.  Her  is a heavy smoker.  They plan to quit together as a New Year's resolution.    Patient has been taking her statin inconsistently.  She reports that she forgets to take it.    No LMP recorded. Patient has had an implant.    Patient verbally consented to phone visit.  Location of patient: unknown. Location of provider: office.  Start time of phone conversation: 5:28. End time of phone conversation: 5:50.

## 2022-12-20 PROBLEM — E78.5 DYSLIPIDEMIA: Status: ACTIVE | Noted: 2022-12-20

## 2022-12-20 PROBLEM — F17.200 NICOTINE DEPENDENCE, UNCOMPLICATED, UNSPECIFIED NICOTINE PRODUCT TYPE: Status: ACTIVE | Noted: 2022-12-20

## 2022-12-20 PROBLEM — F11.90 OPIOID USE DISORDER: Status: ACTIVE | Noted: 2022-12-20

## 2022-12-20 RX ORDER — RISPERIDONE 50 MG/2 ML
50 KIT INTRAMUSCULAR
COMMUNITY
End: 2023-04-05

## 2023-01-09 ENCOUNTER — TELEPHONE (OUTPATIENT)
Dept: FAMILY MEDICINE | Facility: CLINIC | Age: 43
End: 2023-01-09

## 2023-01-09 DIAGNOSIS — F11.90 OPIOID USE DISORDER: ICD-10-CM

## 2023-01-09 NOTE — TELEPHONE ENCOUNTER
Reason for call:  Medication   If this is a refill request, has the caller requested the refill from the pharmacy already? No  Will the patient be using a [facility]? No  Name of the pharmacy and phone number for the current request:     Name of the medication requested: Suboxone 8mg, has enough until 1/18/23    Other request:     Phone number to reach patient:  Cell number on file:    Telephone Information:   Mobile [phone]       Best Time:  anytime    Can we leave a detailed message on this number?  YES    Travel screening: Not Applicable

## 2023-01-10 ENCOUNTER — NURSE TRIAGE (OUTPATIENT)
Dept: FAMILY MEDICINE | Facility: CLINIC | Age: 43
End: 2023-01-10

## 2023-01-10 ENCOUNTER — TELEPHONE (OUTPATIENT)
Dept: FAMILY MEDICINE | Facility: CLINIC | Age: 43
End: 2023-01-10
Payer: COMMERCIAL

## 2023-01-10 RX ORDER — BUPRENORPHINE AND NALOXONE 8; 2 MG/1; MG/1
1 FILM, SOLUBLE BUCCAL; SUBLINGUAL 3 TIMES DAILY
Qty: 90 FILM | Refills: 0 | Status: SHIPPED | OUTPATIENT
Start: 2023-01-17 | End: 2023-01-20

## 2023-01-10 NOTE — TELEPHONE ENCOUNTER
Pt has appt on 1/20. Needs refill by 1/18. Suboxone refilled.    Please update pt. Please also encourage her to sign up for MyChart. Thanks.

## 2023-01-10 NOTE — TELEPHONE ENCOUNTER
Called pt and relayed message from Dr. Vaughn. Pt is going to take his recommendations. Will go to ED if symptoms persist.    Ronit Qiu RN  [facility]

## 2023-01-10 NOTE — TELEPHONE ENCOUNTER
Rosuvastatin doesn't typically cause chest discomfort. Perhaps it could be causing some GERD.    From a chest pain perspective, if the pain has resolved, then I don't think urgent evaluation is needed. If pt notices chest pain w/ exertion or chest pain that persists for more than 10 minutes, recommend urgent evaluation.    From a statin perspective, recommend pt try to take it in morning and avoid laying down for a hour after taking it. That may help to reduce risk of GERD.    Thanks.

## 2023-01-10 NOTE — TELEPHONE ENCOUNTER
"Nurse Triage SBAR    Is this a 2nd Level Triage? YES, LICENSED PRACTITIONER REVIEW IS REQUIRED    Situation: chest pain from Crestor?    Background: LOV: 12/19/2022     Assessment: Patient started taking Crestor 2 weeks ago. Taking it at night around 6 pm.   For the past week, has noticed left-sided chest pain around 9pm, pain 3/10, \"tightness\", off and on for 5 min, then goes away the rest of the night.   Last night she did not take her dose and did not get chest pain.   No other symptoms but did get a little sob after the chest pain stopped, about 5 days ago, was breathing harder.     Protocol Recommended Disposition:   Go To ED/UCC Now (Or To Office With PCP Approval)    Recommendation: Routing to PCP team to review.      Routed to provider    Does the patient meet one of the following criteria for ADS visit consideration? 16+ years old, with an [facility] PCP     TIP  Providers, please consider if this condition is appropriate for management at one of our Acute and Diagnostic Services sites.     If patient is a good candidate, please use dotphrase <dot>triageresponse and select Refer to ADS to document.     Reason for Disposition    Chest pain lasting longer than 5 minutes and occurred in last 3 days (72 hours) (Exception: feels exactly the same as previously diagnosed heartburn and has accompanying sour taste in mouth)    Answer Assessment - Initial Assessment Questions  1. LOCATION: \"Where does it hurt?\"        Left sided chest pain    2. RADIATION: \"Does the pain go anywhere else?\" (e.g., into neck, jaw, arms, back)      No    3. ONSET: \"When did the chest pain begin?\" (Minutes, hours or days)       1 week ago    4. PATTERN \"Does the pain come and go, or has it been constant since it started?\"  \"Does it get worse with exertion?\"       Only at night - around 8-9pm, lasts 5 min off and on, then it goes away.     5. DURATION: \"How long does it last\" (e.g., seconds, minutes, hours)      5 min off and on     6. " "SEVERITY: \"How bad is the pain?\"  (e.g., Scale 1-10; mild, moderate, or severe)     - MILD (1-3): doesn't interfere with normal activities      - MODERATE (4-7): interferes with normal activities or awakens from sleep     - SEVERE (8-10): excruciating pain, unable to do any normal activities        3/10     7. CARDIAC RISK FACTORS: \"Do you have any history of heart problems or risk factors for heart disease?\" (e.g., angina, prior heart attack; diabetes, high blood pressure, high cholesterol, smoker, or strong family history of heart disease)      High cholesterol, smoker    8. PULMONARY RISK FACTORS: \"Do you have any history of lung disease?\"  (e.g., blood clots in lung, asthma, emphysema, birth control pills)      No    9. CAUSE: \"What do you think is causing the chest pain?\"      Crestor    10. OTHER SYMPTOMS: \"Do you have any other symptoms?\" (e.g., dizziness, nausea, vomiting, sweating, fever, difficulty breathing, cough)        Little cough - 3 days ago     11. PREGNANCY: \"Is there any chance you are pregnant?\" \"When was your last menstrual period?\"        no    Protocols used: CHEST PAIN-A-[place]      "

## 2023-01-20 ENCOUNTER — VIRTUAL VISIT (OUTPATIENT)
Dept: FAMILY MEDICINE | Facility: CLINIC | Age: 43
End: 2023-01-20
Payer: COMMERCIAL

## 2023-01-20 DIAGNOSIS — F11.90 OPIOID USE DISORDER: Primary | ICD-10-CM

## 2023-01-20 DIAGNOSIS — F17.200 NICOTINE DEPENDENCE, UNCOMPLICATED, UNSPECIFIED NICOTINE PRODUCT TYPE: ICD-10-CM

## 2023-01-20 DIAGNOSIS — E78.5 DYSLIPIDEMIA: ICD-10-CM

## 2023-01-20 PROBLEM — F29 PSYCHOSIS (H): Status: ACTIVE | Noted: 2019-05-17

## 2023-01-20 PROCEDURE — 99214 OFFICE O/P EST MOD 30 MIN: CPT | Mod: GT | Performed by: FAMILY MEDICINE

## 2023-01-20 RX ORDER — BUPRENORPHINE AND NALOXONE 8; 2 MG/1; MG/1
1 FILM, SOLUBLE BUCCAL; SUBLINGUAL 3 TIMES DAILY
Qty: 90 FILM | Refills: 0 | Status: SHIPPED | OUTPATIENT
Start: 2023-02-16 | End: 2023-02-22

## 2023-01-20 NOTE — PROGRESS NOTES
Assessment/Plan - Video Appointment    OUD. Sustained remission. Continue buprenorphine 24 mg/d. [place] prescription monitoring database reviewed today. Pt plans to schedule lab appt at [facility] for urine drug screen.    Cannabis use. Again reviewed risk of dependence and mental health destabilzation. Pt plans to talk w/ psychiatrist about whether she would be a good candidate for medical cannabis.    Dyslipidemia. Continue statin. Recheck lipids in 2-3 mos.    Smoking. 1/2 ppd.  doesn't seem ready to quit. Pt plans to continue to work on cutting down. She will start NRT patch soon.    F/u in 1 month.    ----  Chief complaint: Recheck Medication    Social History     Social History Narrative    Lives with .  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     Working a few days/wk.    Stressful situation over [place]. Bishop's mom hospitalized w/ bowel perforation. Recovering gradually.    Daughter has returned to school for police training.    Low energy.    Starting to take statin more consistently and noticed chest discomfort. Moved dosing to morning and chest discomfort has resolved.    Denies med changes since last visit. Happy w/ current buprenorphine dose. Denies EtOH or illicit drug use since last visit, except for cannabis. Uses this most nights for sleep and relaxation. Hasn't noticed any negative effects of cannabis.    Exam. Speech clear. No tachypnea.  No LMP recorded. Patient has had an implant.    Patient verbally consented to telehealth visit.  Location of patient: home. Location of provider: office.  Start time of conversation: 7.38am. End time of conversation: 7.57am.  Billing based on complexity of encounter.

## 2023-01-26 ENCOUNTER — LAB (OUTPATIENT)
Dept: LAB | Facility: OTHER | Age: 43
End: 2023-01-26
Payer: COMMERCIAL

## 2023-01-26 DIAGNOSIS — F11.90 OPIOID USE DISORDER: ICD-10-CM

## 2023-01-26 PROCEDURE — 80306 DRUG TEST PRSMV INSTRMNT: CPT

## 2023-02-16 ENCOUNTER — VIRTUAL VISIT (OUTPATIENT)
Dept: FAMILY MEDICINE | Facility: CLINIC | Age: 43
End: 2023-02-16
Payer: COMMERCIAL

## 2023-02-16 DIAGNOSIS — R07.89 ATYPICAL CHEST PAIN: ICD-10-CM

## 2023-02-16 DIAGNOSIS — F11.90 OPIOID USE DISORDER: Primary | ICD-10-CM

## 2023-02-16 PROCEDURE — 99214 OFFICE O/P EST MOD 30 MIN: CPT | Mod: VID | Performed by: FAMILY MEDICINE

## 2023-02-16 NOTE — PROGRESS NOTES
Assessment/Plan - Video Appointment    OUD. Sustained remission per pt's report, though last UDS was positive for oxycodone and negative for buprenorphine. Erma states repeatedly today that she has not used oxycodone recently.  -after visit, I received a call from lab on 2/17/23. They checked analyzer printout from last UDS (1/26/23) and are updating result in Epic: positive for THC (not a change), oxycodone (not a change), buprenorphine (change)  -repeat UDS. Erma prefers testing through [facility], so I sent her a letter w/ lab orders via [facility]  -continue buprenorphine with no dose change  -[place] prescription monitoring database reviewed today  -continue meetings. Erma considering switch from online to in-person    Atypical chest pain. Could be 2/2 statin. Trend. Improving.    F/u in 1 month.    ----  Chief complaint: Recheck Medication    Social History     Social History Narrative    Lives with .  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    's mom recovering from GI issues. Now out of hospital and at TCU.    Erma working a few days/wk. May start to care for mother-in-law once she discharges from TCU.    Taking statin more consistently. Noted some chest pain after starting this med, but it has been improving. Feels this above left breast. Intermittent. Mainly occurs in evening. No associated dyspnea, palpitations or UE paresthesias. Not exertional. No personal hx of heart disease.    Denies med changes since last visit.    Suboxone 3 films most days, 2 films every 2nd or 3rd day. Feeling ok w/ this dose. Attendings meetings through [employer]. Using cannabis edibles. Otherwise denies EtOH or illicit drug use since last visit.    No LMP recorded. Patient has had an implant.    Patient verbally consented to telehealth visit.  Location of patient: home. Location of provider: office.  Start time of conversation: 10am. End time of  conversation: 10.20am.  Billing based on complexity of encounter.

## 2023-02-16 NOTE — LETTER
February 16, 2023      Erma Gregory  [address] [address]    2/16/2023     Dear lab representative:     Please see the following orders:     Patient name: Erma Gregory  YOB: 1980      Ordering provider:  Vicente Vaughn MD  [facility]  [address] [place] [address]  Phone [phone]  Fax [phone]     Orders:  Urine drug screen without confirmation  Urine buprenorphine with metabolites    Associated diagnosis (ICD-10):  Opioid use disorder (F11.90)     Please fax results to the number above.     Thanks.     Sincerely,        Vicente Vaughn MD

## 2023-02-17 LAB
AMPHETAMINES UR QL: NOT DETECTED
BARBITURATES UR QL SCN: NOT DETECTED
BENZODIAZ UR QL SCN: NOT DETECTED
BUPRENORPHINE UR QL: DETECTED
CANNABINOIDS UR QL: DETECTED
COCAINE UR QL SCN: NOT DETECTED
D-METHAMPHET UR QL: NOT DETECTED
METHADONE UR QL SCN: NOT DETECTED
OPIATES UR QL SCN: NOT DETECTED
OXYCODONE UR QL SCN: DETECTED
PCP UR QL SCN: NOT DETECTED
PROPOXYPH UR QL: NOT DETECTED
TRICYCLICS UR QL SCN: NOT DETECTED

## 2023-02-22 ENCOUNTER — TELEPHONE (OUTPATIENT)
Dept: FAMILY MEDICINE | Facility: CLINIC | Age: 43
End: 2023-02-22
Payer: COMMERCIAL

## 2023-02-22 NOTE — TELEPHONE ENCOUNTER
Called patient to confirm email on file to fax letter with order information to her email.    Michael Penaloza RN   [facility]

## 2023-02-22 NOTE — TELEPHONE ENCOUNTER
[facility] can't access our Epic orders. I sent Erma a letter with order. Please see letter dated 2/16. Let me know if there is an issue with the order listed in that letter. Thanks.    Addendum, February 22, 2023, 1:35 PM:  I received a MyChart message from Erma. It looks like I just need to manually sign the letter. I will do that and then bring you a copy to send either to pt or [facility]. Thanks.

## 2023-02-22 NOTE — TELEPHONE ENCOUNTER
EKG contacted for holter monitor - they confirm   [facility] and they told her that they do not have the correct order for it. Pended urine drug test if that is appropriate.     Thanks!  Michael Penaloza RN   [facility]

## 2023-03-02 ENCOUNTER — MYC MEDICAL ADVICE (OUTPATIENT)
Dept: FAMILY MEDICINE | Facility: CLINIC | Age: 43
End: 2023-03-02
Payer: COMMERCIAL

## 2023-03-02 NOTE — TELEPHONE ENCOUNTER
RN COVID TREATMENT VISIT  03/02/23      The patient has been triaged and does not require a higher level of care.    Erma Gregory  42 year old  Current weight? 150 lbs     Has the patient been seen by a primary care provider at an [facility] or [facility] within the past two years? Yes.   Have you been in close proximity to/do you have a known exposure to a person with a confirmed case of influenza? No.     General treatment eligibility:  Date of positive COVID test (PCR or at home)?  02/28/2023    Are you or have you been hospitalized for this COVID-19 infection? No.   Have you received monoclonal antibodies or antiviral treatment for COVID-19 since this positive test? No.   Do you have any of the following conditions that place you at risk of being very sick from COVID-19?   - Patient reports sedentary lifestyle (physically inactive)  - Current or former smoker  - Substance use disorder including alcohol abuse, alcohol dependency, chemical dependency  Yes, patient has at least one high risk condition as noted above.     Current COVID symptoms:   - fever or chills  - cough  - headache  - sore throat  - congestion or runny nose  - nausea or vomiting  Yes. Patient has at least one symptom as selected.     How many days since symptoms started? 5 days or less. Established patient, 12 years or older weighing at least 88.2 lbs, who has symptoms that started in the past 5 days, has not been hospitalized nor received treatment already, and is at risk for being very sick from COVID-19.     Treatment eligibility by RN:    Are you currently pregnant or nursing? No    Do you have a clinically significant hypersensitivity to nirmatrelvir or ritonavir, or toxic epidermal necrolysis (TEN) or Moore-Marco Antonio Syndrome? No    Do you have a history of hepatitis, any hepatic impairment on the Problem List (such as Child-Teixeira Class C, cirrhosis, fatty liver disease, alcoholic liver disease), or was the  last liver lab (hepatic panel, ALT, AST, ALK Phos, bilirubin) elevated in the past 6 months? No    Do you have any history of severe renal impairment (eGFR < 30mL/min)? No    Is patient eligible to continue?   Yes, patient meets all eligibility requirements for the RN COVID treatment (as denoted by all no responses above).     Current Outpatient Medications   Medication Sig Dispense Refill     buprenorphine HCl-naloxone HCl (SUBOXONE) 8-2 MG per film Place 1 Film under the tongue 3 times daily 90 Film 0     etonogestrel (IMPLANON/NEXPLANON) 68 MG IMPL 1 each by Subdermal route once       nicotine (NICODERM CQ) 21 MG/24HR 24 hr patch Place 1 patch onto the skin every 24 hours 30 patch 2     QUEtiapine (SEROQUEL XR) 50 MG TB24 24 hr tablet Take by mouth nightly as needed for sleep       risperiDONE microspheres ER (RISPERDAL CONSTA) 50 MG injection Inject 50 mg into the muscle every 14 days       rosuvastatin (CRESTOR) 5 MG tablet Take 1 tablet by mouth daily         Medications from List 1 of the standing order (on medications that exclude the use of Paxlovid) that patient is taking: NONE. Is patient taking Luis's Wort? No  Is patient taking Cayce's Wort or any meds from List 1? No.   Medications from List 2 of the standing order (on meds that provider needs to adjust) that patient is taking: quetiapine (Seroquel), explained a provider visit is necessary to discuss medication adjustments while taking Paxlovid. Is patient on any of the meds from List 2? Yes. Patient will be scheduled or transferred to a  at the end of this call.   OSEI ROWLAND RN

## 2023-03-02 NOTE — TELEPHONE ENCOUNTER
Attempted to call pt to discuss Paxlovid. Asked pt to call the clinic back.     Ronit Qiu RN  [facility]

## 2023-03-03 ENCOUNTER — VIRTUAL VISIT (OUTPATIENT)
Dept: FAMILY MEDICINE | Facility: OTHER | Age: 43
End: 2023-03-03
Payer: COMMERCIAL

## 2023-03-03 DIAGNOSIS — U07.1 INFECTION DUE TO 2019 NOVEL CORONAVIRUS: Primary | ICD-10-CM

## 2023-03-03 PROCEDURE — 99213 OFFICE O/P EST LOW 20 MIN: CPT | Mod: CS | Performed by: STUDENT IN AN ORGANIZED HEALTH CARE EDUCATION/TRAINING PROGRAM

## 2023-03-03 ASSESSMENT — ANXIETY QUESTIONNAIRES
GAD7 TOTAL SCORE: 0
IF YOU CHECKED OFF ANY PROBLEMS ON THIS QUESTIONNAIRE, HOW DIFFICULT HAVE THESE PROBLEMS MADE IT FOR YOU TO DO YOUR WORK, TAKE CARE OF THINGS AT HOME, OR GET ALONG WITH OTHER PEOPLE: NOT DIFFICULT AT ALL
GAD7 TOTAL SCORE: 0
4. TROUBLE RELAXING: NOT AT ALL
7. FEELING AFRAID AS IF SOMETHING AWFUL MIGHT HAPPEN: NOT AT ALL
3. WORRYING TOO MUCH ABOUT DIFFERENT THINGS: NOT AT ALL
7. FEELING AFRAID AS IF SOMETHING AWFUL MIGHT HAPPEN: NOT AT ALL
2. NOT BEING ABLE TO STOP OR CONTROL WORRYING: NOT AT ALL
1. FEELING NERVOUS, ANXIOUS, OR ON EDGE: NOT AT ALL
5. BEING SO RESTLESS THAT IT IS HARD TO SIT STILL: NOT AT ALL
GAD7 TOTAL SCORE: 0
8. IF YOU CHECKED OFF ANY PROBLEMS, HOW DIFFICULT HAVE THESE MADE IT FOR YOU TO DO YOUR WORK, TAKE CARE OF THINGS AT HOME, OR GET ALONG WITH OTHER PEOPLE?: NOT DIFFICULT AT ALL
6. BECOMING EASILY ANNOYED OR IRRITABLE: NOT AT ALL

## 2023-03-03 ASSESSMENT — PATIENT HEALTH QUESTIONNAIRE - PHQ9
10. IF YOU CHECKED OFF ANY PROBLEMS, HOW DIFFICULT HAVE THESE PROBLEMS MADE IT FOR YOU TO DO YOUR WORK, TAKE CARE OF THINGS AT HOME, OR GET ALONG WITH OTHER PEOPLE: NOT DIFFICULT AT ALL
SUM OF ALL RESPONSES TO PHQ QUESTIONS 1-9: 0
SUM OF ALL RESPONSES TO PHQ QUESTIONS 1-9: 0

## 2023-03-03 NOTE — PATIENT INSTRUCTIONS
COVID-19 Outpatient Treatments  Your care team can help you find the best treatments for COVID-19. Talk to a health care provider or refer to the FDA medicine fact sheets below.    Important: You can't have Paxlovid or molnupiravir if you're starting the medicine more than 5 days after your symptoms have started.  Paxlovid: https://www.fda.gov/media/102247/download  Molnupiravir (Lagevrio): https://www.fda.gov/media/256920/download  Paxlovid (nimatrelvir and ritonavir)  How it works  Two medicines (nirmatrelvir and ritonavir) are taken together. They stop the virus from growing. Less amount of virus is easier for your body to fight.  Benefits  Lowers risk of a hospital stay or death from COVID-19.  How to take    Medicine comes in a daily container with both medicine tablets. Take by mouth twice daily (once in the morning, once at night) for 5 days.    The number of tablets to take varies by patient.    Don't chew or break capsules. Swallow whole.  When to take  Take as soon as possible after positive COVID-19 test result, and within 5 days of your first symptoms.  Who can take it  Patients must be 12 years or older, weigh at least 88 pounds, and have tested positive for COVID-19. Paxlovid is the preferred treatment for pregnant patients.  Possible side effects  Can cause altered sense of taste, diarrhea (loose, watery stools), high blood pressure, muscle aches.  Medicine conflicts    Some medicines may conflict with Paxlovid and may cause serious side effects.    Tell your care team about all the medicines you take, including prescription and over-the-counter medicines, vitamins, and herbal supplements.    Your care team will review your medicines to make sure that you can safely take Paxlovid.  Cautions    Paxlovid is not advised for patients with severe kidney or liver disease. If you have kidney or liver problems, the dose may need to be changed.    If you're pregnant or breastfeeding, talk to your care team  about your options.    If you take hormonal birth control (such as the Pill), then you or your partner should also use a non-hormonal form of birth control (such as a condom). Keep doing this for 1 menstrual cycle after your last dose of Paxlovid.  Molnupiravir (Lagevrio)  How it works  Stops the virus from growing. Less amount of virus is easier for your body to fight.  Benefits  Lowers risk of a hospital stay or death from COVID-19.  How to take  Take 4 capsules by mouth every 12 hours (4 in the morning and 4 at night) for 5 days. Don't chew or break capsules. Swallow whole.  When to take  Take as soon as possible after positive COVID-19 test result, and within 5 days of your first symptoms.  Who can take it  Patients must be 18 years or older and have tested positive for COVID-19.  Possible side effects  Diarrhea (loose, watery stools), nausea (feeling sick to your stomach), dizziness, headaches.  Medicine conflicts  Right now, there are no known conflicts with other drugs. But tell your care team about all medicines you take.  Cautions    This medicine is not advised for patients who are pregnant.    If you are someone who could become pregnant, use trusted birth control until 4 days after your last dose of molnupiravir.    If your partner could become pregnant, you should use trusted birth control until 3 months after your last dose of molnupiravir.  For informational purposes only. Not to replace the advice of your health care provider. Copyright   2022 [facility]. All rights reserved. Clinically reviewed by Cecily Lomeli, PharmD, BCACP. [employer] 515212 - REV 12/22.    Instructions for Patients      What are the symptoms of COVID-19?  Symptoms can include fever, cough, shortness of breath, chills, headache, muscle pain sore throat, fatigue, runny or stuffy nose, and loss of taste and smell. Other less common symptoms include nausea, vomiting, or diarrhea (watery stools).    Know when to call  911. Emergency warning signs include:    Trouble breathing or shortness of breath    Pain or pressure in the chest that doesn't go away    Feeling confused like you haven't felt before, or not being able to wake up    Bluish-colored lips or face    How can I take care of myself?  4. Get lots of rest. Drink extra fluids (unless a doctor has told you not to).  5. Take Tylenol (acetaminophen) for fever or pain. If you have liver or kidney problems, ask your family doctor if it's okay to take Tylenol   Adults can take either:   650 mg (two 325 mg pills or tablets) every 4 to 6 hours, or...   1,000 mg (two 500 mg pills) every 8 hours as needed.  Note: Don't take more than 3,000 mg in one day. Acetaminophen is found in many medicines (both prescribed and over the counter). Read all labels to be sure you don't take too much.  For children, check the Tylenol bottle for the right dose. The dose is based on the child's age or weight.  6. Take over the counter medicines for your symptoms as needed. Talk to your pharmacist.  7. If you have other health problems (like cancer, heart failure, an organ transplant, or severe kidney disease): Call your specialty clinic if you don't feel better in the next 2 days.    Where can I get more information?    [facility] COVID-19 Resource Hub: [email or web address]/     CDC Quarantine & Isolation: https://www.cdc.gov/coronavirus/2019-ncov/your-health/quarantine-isolation.html     CDC - What to Do If You're Sick: https://www.cdc.gov/coronavirus/2019-ncov/if-you-are-sick/index.html    Learn about the ACTIV-6 Clinical Trial: activ6.[facility].edu or call (392)-891-6734

## 2023-03-03 NOTE — PROGRESS NOTES
Erma is a 42 year old who is being evaluated via a billable video visit.      How would you like to obtain your AVS? MyChart  If the video visit is dropped, the invitation should be resent by: Text to cell phone: [phone]  Will anyone else be joining your video visit? No          Assessment & Plan     Infection due to 2019 novel coronavirus  On quetiapine for as needed use for insomnia  Hyperlipidemia on Crestor  Will initiate oral antiviral treatment at this time for the patient.  Medication hold times were discussed no history of CKD, recent lab results reviewed. We discussed risks and benefits of this option as well as alternative options along with those risks and benefits.  We also discussed about conservative/symptomatic measures, quarantine recommendations, and worrisome signs and symptoms in the event that more urgent/emergent medical treatment is needed.  They voiced their understanding to all this.  Additional COVID-19 information is outlined on the AVS.    Follow-up as needed or if symptoms worsen.    - nirmatrelvir and ritonavir (PAXLOVID) therapy pack; Take 3 tablets by mouth 2 times daily for 5 days (Take 2 Nirmatrelvir tablets and 1 Ritonavir tablet twice daily for 5 days)      CONSTANTINE GARG MD  [facility]   Erma is a 42 year old presenting for the following health issues:  Medication Request (Covid treatment)    COVID pos with at home test 2 days ago  Symptoms include headache, vomiting, congestion, low grade fever, sore throat. Started 4 days ago on Monday  Noted slight CP this am but none now.   Has only tried tylenol with headache, has helped.     History of Present Illness       Reason for visit:  Covid Treatment    She eats 2-3 servings of fruits and vegetables daily.She consumes 4 sweetened beverage(s) daily.She exercises with enough effort to increase her heart rate 20 to 29 minutes per day.  She exercises with enough effort to increase  her heart rate 3 or less days per week. She is missing 1 dose(s) of medications per week.  She is not taking prescribed medications regularly due to remembering to take.    Today's PHQ-9         PHQ-9 Total Score: 0    PHQ-9 Q9 Thoughts of better off dead/self-harm past 2 weeks :   Not at all    How difficult have these problems made it for you to do your work, take care of things at home, or get along with other people: Not difficult at all  Today's KAELYN-7 Score: 0         Review of Systems   Constitutional, HEENT, cardiovascular, pulmonary, gi and gu systems are negative, except as otherwise noted.      Objective           Vitals:  No vitals were obtained today due to virtual visit.    Physical Exam   GENERAL: Healthy, alert and no distress  EYES: Eyes grossly normal to inspection.  No discharge or erythema, or obvious scleral/conjunctival abnormalities.  RESP: No audible wheeze, cough, or visible cyanosis.  No visible retractions or increased work of breathing.    SKIN: Visible skin clear. No significant rash, abnormal pigmentation or lesions.  NEURO: Cranial nerves grossly intact.  Mentation and speech appropriate for age.  PSYCH: Mentation appears normal, affect normal/bright, judgement and insight intact, normal speech and appearance well-groomed.          Video-Visit Details    Type of service:  Video Visit     Originating Location (pt. Location): Home    Distant Location (provider location):  Off-site  Platform used for Video Visit: Darling

## 2023-03-23 ENCOUNTER — MYC MEDICAL ADVICE (OUTPATIENT)
Dept: FAMILY MEDICINE | Facility: CLINIC | Age: 43
End: 2023-03-23
Payer: COMMERCIAL

## 2023-03-23 DIAGNOSIS — F11.90 OPIOID USE DISORDER: ICD-10-CM

## 2023-03-23 RX ORDER — BUPRENORPHINE AND NALOXONE 8; 2 MG/1; MG/1
1 FILM, SOLUBLE BUCCAL; SUBLINGUAL 3 TIMES DAILY
Qty: 90 FILM | Refills: 0 | Status: CANCELLED | OUTPATIENT
Start: 2023-03-23

## 2023-03-23 NOTE — TELEPHONE ENCOUNTER
Requested Prescriptions   Pending Prescriptions Disp Refills     buprenorphine HCl-naloxone HCl (SUBOXONE) 8-2 MG per film 90 Film 0     Sig: Place 1 Film under the tongue 3 times daily       There is no refill protocol information for this order        Routing refill request to provider for review/approval because:  Drug not on the [facility] refill protocol     Ronit Qiu RN  [facility]

## 2023-03-24 ENCOUNTER — TELEPHONE (OUTPATIENT)
Dept: FAMILY MEDICINE | Facility: CLINIC | Age: 43
End: 2023-03-24
Payer: COMMERCIAL

## 2023-03-24 DIAGNOSIS — F11.90 OPIOID USE DISORDER: ICD-10-CM

## 2023-03-24 RX ORDER — BUPRENORPHINE AND NALOXONE 8; 2 MG/1; MG/1
1 FILM, SOLUBLE BUCCAL; SUBLINGUAL 3 TIMES DAILY
Qty: 90 FILM | Refills: 0 | Status: SHIPPED | OUTPATIENT
Start: 2023-03-24 | End: 2023-04-05

## 2023-03-24 NOTE — TELEPHONE ENCOUNTER
Patient calling stating she needs refill for suboxone and would like it refilled still today as she will be out of the medication on Sunday.    Please see refill request from yesterday.     Thanks!  Michael Penaloza RN   [facility]

## 2023-03-27 NOTE — TELEPHONE ENCOUNTER
LVM informed rx sent to pharmacy    Mallory LICONA RN  [facility]

## 2023-04-05 ENCOUNTER — VIRTUAL VISIT (OUTPATIENT)
Dept: FAMILY MEDICINE | Facility: CLINIC | Age: 43
End: 2023-04-05
Payer: COMMERCIAL

## 2023-04-05 DIAGNOSIS — F11.90 OPIOID USE DISORDER: Primary | ICD-10-CM

## 2023-04-05 DIAGNOSIS — F17.200 NICOTINE DEPENDENCE, UNCOMPLICATED, UNSPECIFIED NICOTINE PRODUCT TYPE: ICD-10-CM

## 2023-04-05 PROCEDURE — 99214 OFFICE O/P EST MOD 30 MIN: CPT | Mod: VID | Performed by: FAMILY MEDICINE

## 2023-04-05 RX ORDER — RISPERIDONE 4 MG/1
4 TABLET ORAL AT BEDTIME
COMMUNITY
Start: 2023-03-08 | End: 2023-11-07

## 2023-04-05 RX ORDER — BUPRENORPHINE AND NALOXONE 8; 2 MG/1; MG/1
1 FILM, SOLUBLE BUCCAL; SUBLINGUAL 3 TIMES DAILY
Qty: 90 FILM | Refills: 0 | Status: SHIPPED | OUTPATIENT
Start: 2023-04-23 | End: 2023-05-08

## 2023-04-05 NOTE — LETTER
4/5/2023       Dear lab representative:     Please see the following orders:     Patient name: Erma Gregory  YOB: 1980      Ordering provider:  Vicente Vaughn MD  [address] [address]  Phone [phone]  Fax [phone]     Orders:  Urine drug screen without confirmation     Associated diagnosis (ICD-10):  Opioid use disorder (F11.90)     Please fax results to the number above.     Thanks.     Sincerely,            Vicente Vaughn MD

## 2023-04-05 NOTE — PROGRESS NOTES
Assessment/Plan - Video Appointment    Opioid dependence.  Sustained remission.  Continue buprenorphine 24 mg daily.  [place] prescription monitoring database reviewed today.    Tobacco use disorder.  Cessation strategies discussed.    Urine drug screen positive for oxycodone in February.  Repeat test at [facility] was negative for oxycodone.  Patient has repeatedly stated that she did not use oxycodone around the time of test.  I suspect false-positive.  We will repeat urine drug screen today.  Patient prefers to have this collected at [facility], so I wrote an order in letter form and will email this to patient.    F/u in 1 month.    ----  Chief complaint: Recheck Medication    Social History     Social History Narrative    Lives with .  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     Patient will soon travel to [place] to visit family and to check on a plot of land that she owns.  She will travel with her , Bishop.    Her kids are doing okay overall.  One of her daughters is training with the [employer].    Patient continues to experience a sensation of low energy.  She talked with her psychiatrist and was switched from intramuscular risperidone to oral risperidone.  She will be taking this at night.  She denies feeling depressed and reports that her mood is relatively good overall.    Patient otherwise denies medication changes since her last visit.    She had been trying to taper her buprenorphine dose, but has now decided to return to her previous dose of 3 films per day.  She denies alcohol or illicit drug use since last visit, with the exception of cannabis, which she does not use daily.    She continues to take statin and is tolerating this.    Patient has noticed increased tobacco use.  She is now smoking about a pack per day.  She thinks boredom may be a factor.  She plans to look for a new client after returning from [place].  In the past, nicotine  gum and nicotine patches were not covered by insurance.    No LMP recorded. Patient has had an implant.    Patient verbally consented to telehealth visit.  Location of patient: home. Location of provider: office.  Start time of conversation: 1.05pm. End time of conversation: 1.23pm.  Billing based on complexity of encounter.

## 2023-04-16 ENCOUNTER — HEALTH MAINTENANCE LETTER (OUTPATIENT)
Age: 43
End: 2023-04-16

## 2023-05-04 ENCOUNTER — TELEPHONE (OUTPATIENT)
Dept: FAMILY MEDICINE | Facility: CLINIC | Age: 43
End: 2023-05-04
Payer: COMMERCIAL

## 2023-05-04 DIAGNOSIS — F11.90 OPIOID USE DISORDER: ICD-10-CM

## 2023-05-04 NOTE — TELEPHONE ENCOUNTER
Medication Question or Refill        What medication are you calling about (include dose and sig)?: buprenorphine HCl-naloxone HCl (SUBOXONE) 8-2 MG per film    Preferred Pharmacy:     [facility] [address]  [address] [facility] [address]  Phone: [phone] Fax: [phone]      Controlled Substance Agreement on file:   CSA -- Patient Level:    CSA: None found at the patient level.       Who prescribed the medication?: Vicente Vaughn      Do you need a refill? Yes    When did you use the medication last? 05/03/2023    Patient offered an appointment? No    Do you have any questions or concerns?  Yes: Patient [place] the remaining pills that she had out of state and is completely out.      Could we send this information to you in [email or web address] or would you prefer to receive a phone call?:   Patient would prefer a phone call   Okay to leave a detailed message?: Yes at Cell number on file:    Telephone Information:   Mobile [phone]

## 2023-05-08 ENCOUNTER — VIRTUAL VISIT (OUTPATIENT)
Dept: FAMILY MEDICINE | Facility: CLINIC | Age: 43
End: 2023-05-08
Payer: COMMERCIAL

## 2023-05-08 DIAGNOSIS — F11.90 OPIOID USE DISORDER: Primary | ICD-10-CM

## 2023-05-08 DIAGNOSIS — F17.200 NICOTINE DEPENDENCE, UNCOMPLICATED, UNSPECIFIED NICOTINE PRODUCT TYPE: ICD-10-CM

## 2023-05-08 PROCEDURE — 99213 OFFICE O/P EST LOW 20 MIN: CPT | Mod: VID | Performed by: FAMILY MEDICINE

## 2023-05-08 RX ORDER — BUPRENORPHINE AND NALOXONE 8; 2 MG/1; MG/1
1 FILM, SOLUBLE BUCCAL; SUBLINGUAL 3 TIMES DAILY
Qty: 90 FILM | Refills: 0 | Status: SHIPPED | OUTPATIENT
Start: 2023-05-08 | End: 2023-06-01

## 2023-05-08 NOTE — TELEPHONE ENCOUNTER
Dr Vaughn    Pt needs refill, she left her script in [place] last week, fatuma only ahd a couple of days left before a refill and is now out of med for the past 4 days    Monica Castillo RN   [facility]

## 2023-05-08 NOTE — PROGRESS NOTES
Assessment/Plan - Video Appointment    Opioid use disorder.  Doing well.  -  Continue buprenorphine 24 mg daily  - [place] prescription monitoring database reviewed today  -  Patient reports losing a few days of medication.  I think she is being honest regarding this.  Her recent urine drug screens (at [facility]) have been reassuring and she has not been repeatedly requesting early refills    Tobacco use disorder.  Patient wants to quit.  -  Patient will  nicotine patches that were previously prescribed    Follow-up in 2 months for medication check.    ----  Chief complaint: Recheck Medication    Social History     Social History Narrative    Lives with .  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     Patient recently returned from a trip to [place].  While there, she reports that she accidentally lost some of her buprenorphine.  She states that her mother and her have the same make-up bag and that she accidentally took the wrong one.  She lost about 4 days of medication.  She informed her mother, who plans to save the medication until patient returns in a few months.    No medication changes since last visit.    Patient feels that her mental health has been good recently.  She denies any recent visual hallucinations.    She denies recent alcohol or illicit drug use, with the exception of cannabis.  Her cannabis use decreased while she was visiting [place].    She continues to smoke about 1 pack/day of cigarettes.  Her  also smokes.  She plans to  the nicotine patch from the pharmacy soon.    Patient is looking for a new client for work.    Exam  No LMP recorded. Patient has had an implant.    Patient verbally consented to telehealth visit.  Location of patient: home. Location of provider: office.  Start time of conversation: 5:34pm. End time of conversation: 5:50pm.  Billing based on complexity of encounter.

## 2023-06-01 ENCOUNTER — MYC MEDICAL ADVICE (OUTPATIENT)
Dept: FAMILY MEDICINE | Facility: CLINIC | Age: 43
End: 2023-06-01
Payer: COMMERCIAL

## 2023-06-01 DIAGNOSIS — F11.90 OPIOID USE DISORDER: ICD-10-CM

## 2023-06-01 RX ORDER — BUPRENORPHINE AND NALOXONE 8; 2 MG/1; MG/1
1 FILM, SOLUBLE BUCCAL; SUBLINGUAL 3 TIMES DAILY
Qty: 90 FILM | Refills: 0 | Status: SHIPPED | OUTPATIENT
Start: 2023-06-01 | End: 2023-06-15

## 2023-06-01 NOTE — TELEPHONE ENCOUNTER
Requested Prescriptions   Pending Prescriptions Disp Refills     buprenorphine HCl-naloxone HCl (SUBOXONE) 8-2 MG per film 90 Film 0     Sig: Place 1 Film under the tongue 3 times daily       There is no refill protocol information for this order        Routing refill request to provider for review/approval because:  Drug not on the [facility] refill protocol     Paul Dubose RN  [facility]

## 2023-06-01 NOTE — TELEPHONE ENCOUNTER
Per pt baron today she is leaving out of town at 5 and is requesting refill prior to leaving if possible.    Thanks!  Michael Penaloza RN   [facility]

## 2023-06-01 NOTE — TELEPHONE ENCOUNTER
Sent Archive System[employer] to confirm erin LICONA RN  [facility]

## 2023-06-01 NOTE — TELEPHONE ENCOUNTER
Patient called to request a refill for buprenorphine [facility] 8-2mg.   Patient also requested for provider to reach out to her to discuss this medication.  Giuseppe Dawson

## 2023-06-15 ENCOUNTER — OFFICE VISIT (OUTPATIENT)
Dept: FAMILY MEDICINE | Facility: CLINIC | Age: 43
End: 2023-06-15
Payer: COMMERCIAL

## 2023-06-15 ENCOUNTER — E-CONSULT (OUTPATIENT)
Dept: DERMATOLOGY | Facility: CLINIC | Age: 43
End: 2023-06-15
Payer: COMMERCIAL

## 2023-06-15 ENCOUNTER — MYC MEDICAL ADVICE (OUTPATIENT)
Dept: FAMILY MEDICINE | Facility: CLINIC | Age: 43
End: 2023-06-15

## 2023-06-15 VITALS
BODY MASS INDEX: 28.82 KG/M2 | RESPIRATION RATE: 16 BRPM | DIASTOLIC BLOOD PRESSURE: 74 MMHG | HEART RATE: 89 BPM | SYSTOLIC BLOOD PRESSURE: 112 MMHG | TEMPERATURE: 98.3 F | OXYGEN SATURATION: 99 % | HEIGHT: 65 IN | WEIGHT: 173 LBS

## 2023-06-15 DIAGNOSIS — S29.019A THORACIC MYOFASCIAL STRAIN, INITIAL ENCOUNTER: ICD-10-CM

## 2023-06-15 DIAGNOSIS — L59.0 ERYTHEMA AB IGNE: ICD-10-CM

## 2023-06-15 DIAGNOSIS — F11.20 OPIOID DEPENDENCE ON AGONIST THERAPY (H): Primary | ICD-10-CM

## 2023-06-15 PROCEDURE — 99214 OFFICE O/P EST MOD 30 MIN: CPT | Performed by: FAMILY MEDICINE

## 2023-06-15 PROCEDURE — 99451 NTRPROF PH1/NTRNET/EHR 5/>: CPT | Performed by: DERMATOLOGY

## 2023-06-15 PROCEDURE — 99207 E-CONSULT TO DERMATOLOGY (ADULT OUTPT PROVIDER TO SPECIALIST WRITTEN QUESTION & RESPONSE): CPT | Performed by: FAMILY MEDICINE

## 2023-06-15 RX ORDER — BUPRENORPHINE AND NALOXONE 8; 2 MG/1; MG/1
1 FILM, SOLUBLE BUCCAL; SUBLINGUAL 3 TIMES DAILY
Qty: 90 FILM | Refills: 0 | Status: SHIPPED | OUTPATIENT
Start: 2023-07-01 | End: 2023-07-07

## 2023-06-15 RX ORDER — CYCLOBENZAPRINE HCL 10 MG
5-10 TABLET ORAL 2 TIMES DAILY PRN
Qty: 30 TABLET | Refills: 0 | Status: SHIPPED | OUTPATIENT
Start: 2023-06-15

## 2023-06-15 ASSESSMENT — PAIN SCALES - GENERAL: PAINLEVEL: MILD PAIN (2)

## 2023-06-15 NOTE — PATIENT INSTRUCTIONS
I think your rash is called Livedo Reticularis. I will place a dermatology e-referral and I will get back to you with their response.    Physical therapy folks will call you.    Ok to use muscle relaxer as needed. Be careful because it can make you sleepy.    Recommend you schedule Pap smear and mammogram.

## 2023-06-15 NOTE — PROGRESS NOTES
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 Erythema ab igne is caused by prolonged exposure to heat. Prolonged use of hot water bottles, heating pads, electric blankets, or sitting near a wood stove or fireplace can trigger the formation of this disorder. It has also reportedly been caused by sitting for long periods with a laptop computer on the lap. A long car ride with heated seats or in a hot car may have been the trigger in this case. Although burns do not occur from the heat exposure, the skin develops mottled, reticulated (net-like), pink, reddish, or violaceous patches that eventually become brown from melanin deposition. Biopsy and additional workup are not indicated. The hyperpigmentation will likely fade over time, but in the absence of symptoms, there is no definitive treatment aside from avoidance of triggering factors.\"    ----    Assessment/Plan.    Right upper back pain.  Suspect paraspinal muscle strain.  Will refer to physical therapy.  Start cyclobenzaprine as needed.  Risk of sedation reviewed.    Popliteal rash.  Appears consistent with livedo reticularis.  Patient denies a history of autoimmune illness.  Will place e-consult for dermatology.    Opioid use disorder.  Sustained remission.  Continue buprenorphine 24 mg daily.  [place] prescription monitoring database reviewed today.  Discouraged patient from taking a higher dose of buprenorphine to help with pain.    Patient declined Pap smear today.  She prefers to follow-up with gynecology.  I reminded her that she is also due for a mammogram.    Follow-up in 1 month.    Orders Placed This Encounter   Procedures     REVIEW OF HEALTH MAINTENANCE PROTOCOL ORDERS     Physical Therapy Referral     Adult E-Consult to Dermatology (Outpt Provider to Specialist Written Question & Response)     ----  Chief complaint: Back Pain and Leg " "Problem (Patient c/o veins\"turning brown\" on back of legs/)    Social History     Social History Narrative    Updated 6/15/2023: Lives with , Bishop.  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     Rash in popliteal areas.  This started around the end of May.  Bilateral.  Not painful.  Patient had a long car trip to [place] shortly before the onset of rash.    Right upper back pain.  This started about 6 weeks ago.  Intensity 8 out of 10.  No obvious preceding injury.  No history of chronic back pain or back surgery.  Pain has a burning and pulling quality.  No associated rash.  No radiation to upper extremities or ribs.  Patient tried Bengay and acetaminophen.  She also tried taking an extra 4 mg of Suboxone.    Patient otherwise denies medication changes since last visit.    Patient denies recent alcohol use.  She occasionally uses cannabis.  She denies other recent illicit drug use.    A few months ago, patient was switched from intramuscular to oral risperidone.  She feels that she is tolerating this change.  She is followed by psychiatry.     Exam  /74   Pulse 89   Temp 98.3  F (36.8  C) (Temporal)   Resp 16   Ht 1.651 m (5' 5\")   Wt 78.5 kg (173 lb)   SpO2 99%   BMI 28.79 kg/m    No LMP recorded. Patient has had an implant.    Rash in popliteal areas b/l. See Media tab for photos. Nontender.    No thoracic spinous process tenderness.  Right thoracic paraspinal spasm, nontender.  "

## 2023-06-16 NOTE — PROGRESS NOTES
6/15/2023     E-Consult has been accepted.    Interprofessional consultation requested by:  Vicente Vaughn MD      Clinical Question/Purpose: MY CLINICAL QUESTION IS: Rash in popliteal areas.  This started around the end of May.  Bilateral.  Not painful.  Patient had a long car trip to [place] shortly before the onset of rash. No autoimmune history.    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 Erythema ab igne is caused by prolonged exposure to heat. Prolonged use of hot water bottles, heating pads, electric blankets, or sitting near a wood stove or fireplace can trigger the formation of this disorder. It has also reportedly been caused by sitting for long periods with a laptop computer on the lap. A long car ride with heated seats or in a hot car may have been the trigger in this case. Although burns do not occur from the heat exposure, the skin develops mottled, reticulated (net-like), pink, reddish, or violaceous patches that eventually become brown from melanin deposition. Biopsy and additional workup are not indicated. The hyperpigmentation will likely fade over time, but in the absence of symptoms, there is no definitive treatment aside from avoidance of triggering factors.     Livedo reticularis is on the differential diagnosis but is more often transient and induced by cold exposure. It typically has a more pink appearance rather than tan. The vast majority of cases of livedo reticularis do not require further workup or biopsy if there are no concerning systemic signs pointing to an underlying autoimmune connective tissue disease.      The recommendations provided in this E-Consult are based on a review of clinical data pertinent to the clinical question presented, without a review of the patient's complete medical record or, the benefit of a comprehensive  in-person or virtual patient evaluation. This consultation should not replace the clinical judgement and evaluation of the provider ordering this E-Consult. Any new clinical issues, or changes in patient status since the filing of this E-Consult will need to be taken into account when assessing these recommendations. Please contact me if you have further questions.    My total time spent reviewing clinical information and formulating assessment was 5 minutes.    Vicente Gilbert MD, FAAD   of Dermatology  Department of Dermatology  [facility]

## 2023-07-07 ENCOUNTER — VIRTUAL VISIT (OUTPATIENT)
Dept: FAMILY MEDICINE | Facility: CLINIC | Age: 43
End: 2023-07-07
Attending: FAMILY MEDICINE
Payer: COMMERCIAL

## 2023-07-07 ENCOUNTER — MYC MEDICAL ADVICE (OUTPATIENT)
Dept: FAMILY MEDICINE | Facility: CLINIC | Age: 43
End: 2023-07-07

## 2023-07-07 DIAGNOSIS — F11.20 OPIOID DEPENDENCE ON AGONIST THERAPY (H): ICD-10-CM

## 2023-07-07 DIAGNOSIS — F11.90 OPIOID USE DISORDER: ICD-10-CM

## 2023-07-07 DIAGNOSIS — F11.20 CONTINUOUS OPIOID DEPENDENCE (H): ICD-10-CM

## 2023-07-07 PROCEDURE — 99214 OFFICE O/P EST MOD 30 MIN: CPT | Mod: VID | Performed by: FAMILY MEDICINE

## 2023-07-07 RX ORDER — BUPRENORPHINE AND NALOXONE 4; 1 MG/1; MG/1
1 FILM, SOLUBLE BUCCAL; SUBLINGUAL DAILY
Qty: 30 FILM | Refills: 0 | Status: SHIPPED | OUTPATIENT
Start: 2023-08-01 | End: 2023-08-04

## 2023-07-07 RX ORDER — BUPRENORPHINE AND NALOXONE 8; 2 MG/1; MG/1
1 FILM, SOLUBLE BUCCAL; SUBLINGUAL 3 TIMES DAILY
Qty: 90 FILM | Refills: 0 | Status: SHIPPED | OUTPATIENT
Start: 2023-07-07 | End: 2023-08-04

## 2023-07-07 NOTE — PROGRESS NOTES
Assessment/Plan - Video Appointment.    Opioid use disorder.  Recent increase in cravings.  -  Thanked patient for letting me know about recent increase in cravings  - she appears to be benefiting from recent dose increase.  We will continue buprenorphine at 28 mg daily.  [place] prescription monitoring database reviewed today  - continue recovery meetings    Follow up in 1 month.    ----  Chief complaint: Recheck Medication    Social History     Social History Narrative    Updated 6/15/2023: Lives with , Bishop.  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     Patient sent a message recently stating that she was having increased cravings for opioids.  Patient unsure exactly why cravings have increased.  She thinks that she may have developed a tolerance to Suboxone.  She also notes that her mother-in-law sometimes discusses using prescribed opioids and that this can be triggering.  Mother-in-law has cancer.   has tried to discourage mother-in-law from discussing opioids around patient.  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    At the time that cravings intensified, patient was taking buprenorphine 24 mg daily.  She would take 16 mg in the morning and 8 mg in the afternoon.  After receiving her message, I recommended that she increase daily dose to 28 mg daily.  She has increased her afternoon dose to 12 mg as a result.    Patient feels that higher dose of buprenorphine has been helpful for cravings.  She denies adverse effects from higher dose.    Patient denies alcohol or illicit drug use since last visit, with the exception of cannabis.    Patient has attended online NA and AA meetings through [employer].    Exam  No LMP recorded. Patient has had an implant.    Patient verbally consented to telehealth visit.  Location of patient: home. Location of provider: office.  Start time of conversation: 7.03am. End time of  conversation: 7.17am.  Billing based on complexity of encounter.

## 2023-07-07 NOTE — TELEPHONE ENCOUNTER
Per pt andrést stating rx has only received 4mg suboxone not 8mg. Rx for 8mg sent on 7/1/23, called pharmacy to confirm and they stated they filled 63 on 6/15 when rx was sent and the other 27 on 7/2. Please advise on refill for the 8-2mg suboxone.    Thanks!  Michael Penaloza RN   [facility]

## 2023-07-08 PROBLEM — F11.20 CONTINUOUS OPIOID DEPENDENCE (H): Status: ACTIVE | Noted: 2023-07-08

## 2023-07-31 ENCOUNTER — MYC MEDICAL ADVICE (OUTPATIENT)
Dept: FAMILY MEDICINE | Facility: CLINIC | Age: 43
End: 2023-07-31
Payer: COMMERCIAL

## 2023-07-31 DIAGNOSIS — F17.200 NICOTINE DEPENDENCE, UNCOMPLICATED, UNSPECIFIED NICOTINE PRODUCT TYPE: ICD-10-CM

## 2023-07-31 RX ORDER — NICOTINE 21 MG/24HR
1 PATCH, TRANSDERMAL 24 HOURS TRANSDERMAL EVERY 24 HOURS
Qty: 30 PATCH | Refills: 2 | Status: SHIPPED | OUTPATIENT
Start: 2023-07-31

## 2023-07-31 NOTE — TELEPHONE ENCOUNTER
Please see [facility] message- chantix rx     Do you want a vv to discuss new medication?   Last OV 6/15/23  Please advise     Thank you.   Mallory Mcintosh RN  [facility]

## 2023-07-31 NOTE — TELEPHONE ENCOUNTER
"Requested Prescriptions   Pending Prescriptions Disp Refills    nicotine (NICODERM CQ) 21 MG/24HR 24 hr patch 30 patch 2     Sig: Place 1 patch onto the skin every 24 hours       Partial Cholinergic Nicotinic Agonist Agents Passed - 7/31/2023 10:51 AM        Passed - Blood pressure under 140/90 in past 12 months     BP Readings from Last 3 Encounters:   06/15/23 112/74   10/13/19 114/74   05/28/19 124/64                 Passed - Recent (12 mo) or future (30 days) visit within the authorizing provider's specialty     Patient has had an office visit with the authorizing provider or a provider within the authorizing providers department within the previous 12 mos or has a future within next 30 days. See \"Patient Info\" tab in inbasket, or \"Choose Columns\" in Meds & Orders section of the refill encounter.              Passed - Medication is active on med list        Passed - Patient is 18 years of age or older        Passed - Patient is not pregnant        Passed - No positive pregnancy test on file in past 12 months            Prescription approved per [facility] Refill Protocol.     Ronit Qiu RN  [facility]   "

## 2023-08-04 ENCOUNTER — VIRTUAL VISIT (OUTPATIENT)
Dept: FAMILY MEDICINE | Facility: CLINIC | Age: 43
End: 2023-08-04
Payer: COMMERCIAL

## 2023-08-04 DIAGNOSIS — F17.200 NICOTINE DEPENDENCE, UNCOMPLICATED, UNSPECIFIED NICOTINE PRODUCT TYPE: ICD-10-CM

## 2023-08-04 DIAGNOSIS — F11.20 OPIOID DEPENDENCE ON AGONIST THERAPY (H): Primary | ICD-10-CM

## 2023-08-04 PROCEDURE — 99213 OFFICE O/P EST LOW 20 MIN: CPT | Mod: VID | Performed by: FAMILY MEDICINE

## 2023-08-04 RX ORDER — BUPRENORPHINE AND NALOXONE 4; 1 MG/1; MG/1
1 FILM, SOLUBLE BUCCAL; SUBLINGUAL DAILY
Qty: 30 FILM | Refills: 0 | Status: SHIPPED | OUTPATIENT
Start: 2023-08-30 | End: 2023-09-12

## 2023-08-04 RX ORDER — BUPRENORPHINE AND NALOXONE 8; 2 MG/1; MG/1
1 FILM, SOLUBLE BUCCAL; SUBLINGUAL 3 TIMES DAILY
Qty: 90 FILM | Refills: 1 | Status: SHIPPED | OUTPATIENT
Start: 2023-08-04 | End: 2023-10-02

## 2023-08-04 NOTE — PROGRESS NOTES
Assessment/Plan - Video Appointment.    OUD. Sustained remission.  -continue buprenorphine  -[place] prescription monitoring database reviewed today  -I am hopeful that Erma will restart recovery meetings    Cigarette smoker. Erma plans to start patch soon.    Erma has scheduled mammogram and Pap.    Video call disconnected abruptly. I called Erma multiple times to finish visit, but phone went directly to voicemail. I left a voicemail.    Follow-up in 1-2 months.    ----  Chief complaint: Recheck Medication    Social History     Social History Narrative    Updated 6/15/2023: Lives with , Bishop.  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     Buprenorphine dose was increased about a month ago.  Patient is happy with higher dose.  Fewer cravings.  Denies adverse effects.  Denies alcohol or illicit drug use since last visit, with the exception of cannabis.  Denies concerns regarding cannabis use.    Opioids prescribed for mother-in-law to remain a trigger.  Mother-in-law lives 2 houses away.  Patient tries to leave mother-in-law's house when craving start.    Busy at work.  Now with 5 clients.  Due to work schedule, has had difficulty attending meetings.    Exam  No LMP recorded. Patient has had an implant.    Patient verbally consented to telehealth visit.  Location of patient: home. Location of provider: office.  Start time of conversation: 7.32am. End time of conversation: 7.44am.  Billing based on complexity of encounter.

## 2023-09-12 ENCOUNTER — MYC MEDICAL ADVICE (OUTPATIENT)
Dept: FAMILY MEDICINE | Facility: CLINIC | Age: 43
End: 2023-09-12
Payer: COMMERCIAL

## 2023-09-12 DIAGNOSIS — F11.20 OPIOID DEPENDENCE ON AGONIST THERAPY (H): ICD-10-CM

## 2023-09-12 RX ORDER — BUPRENORPHINE AND NALOXONE 4; 1 MG/1; MG/1
1 FILM, SOLUBLE BUCCAL; SUBLINGUAL DAILY
Qty: 30 FILM | Refills: 0 | Status: SHIPPED | OUTPATIENT
Start: 2023-09-12 | End: 2023-10-02

## 2023-09-12 NOTE — TELEPHONE ENCOUNTER
Per pt mychart requesting refill of 4mg suboxone, rx last sent on 8/30/23. Message sent to pt to call pharmacy.    Michael Penaloza RN   [facility]

## 2023-09-12 NOTE — TELEPHONE ENCOUNTER
Per pt baron states pharmcy never got 8/30 rx, I called pharmacy and they stated the last rx they got for the 4mg was on 8/4 and that they never got the 8/30 prescription.    Pended reorder,     Thanks!  Michael Penaloza RN   [facility]

## 2023-10-02 ENCOUNTER — VIRTUAL VISIT (OUTPATIENT)
Dept: FAMILY MEDICINE | Facility: CLINIC | Age: 43
End: 2023-10-02
Attending: FAMILY MEDICINE
Payer: COMMERCIAL

## 2023-10-02 DIAGNOSIS — E78.5 DYSLIPIDEMIA: ICD-10-CM

## 2023-10-02 DIAGNOSIS — R29.818 SUSPECTED SLEEP APNEA: ICD-10-CM

## 2023-10-02 DIAGNOSIS — F11.20 OPIOID DEPENDENCE ON AGONIST THERAPY (H): Primary | ICD-10-CM

## 2023-10-02 PROCEDURE — 99443 PR PHYSICIAN TELEPHONE EVALUATION 21-30 MIN: CPT | Performed by: FAMILY MEDICINE

## 2023-10-02 RX ORDER — ROSUVASTATIN CALCIUM 5 MG/1
5 TABLET, COATED ORAL DAILY
Qty: 90 TABLET | Refills: 3 | Status: SHIPPED | OUTPATIENT
Start: 2023-10-02 | End: 2023-11-07

## 2023-10-02 RX ORDER — BUPRENORPHINE AND NALOXONE 4; 1 MG/1; MG/1
1 FILM, SOLUBLE BUCCAL; SUBLINGUAL DAILY
Qty: 30 FILM | Refills: 1 | Status: SHIPPED | OUTPATIENT
Start: 2023-10-02 | End: 2023-11-07

## 2023-10-02 RX ORDER — BUPRENORPHINE AND NALOXONE 8; 2 MG/1; MG/1
1 FILM, SOLUBLE BUCCAL; SUBLINGUAL 3 TIMES DAILY
Qty: 90 FILM | Refills: 1 | Status: SHIPPED | OUTPATIENT
Start: 2023-10-02 | End: 2023-11-07

## 2023-10-02 NOTE — PROGRESS NOTES
Assessment/Plan - Phone Appointment.    OUD. Sustained remission.  -continue buprenorphine with no dose change. [place] prescription monitoring database reviewed today  -discussed potential risk of having non-buprenorphine opioids in home (once mother-in-law moves in). Discussed strategies to minimize risk    Fatigue. Chronic. Negative lab workup in past. Suspect BLANQUITA.  -refer for sleep study    Cigarette smoker.  is trying to quit using Chantix.  -Erma plans to start NRT patch. She is understandably nervous about trying Chantix due to mental health hx    F/u in 2 months.    Orders Placed This Encounter   Procedures    Adult Sleep Eval & Management  Referral     ----  Chief complaint: Recheck Medication    Social History     Social History Narrative    Updated 6/15/2023: Lives with , Bishop.  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     Mother-in-law may move in. She has cancer-associated care needs. Mother-in-law is prescribed opioids. Keeps them in safe.    Fatigue. Chronic. Unrestful sleep. Snores. Denies witnessed apnea.     told pt that she seems less happy. Pt denies feeling depressed. Following up w/ psychiatry at [facility].    Denies med changes since last visit.    Cannabis a few times/week. Denies concerns regarding use. Denies recent EtOH or illicit drug use.    Happy w/ current buprenorphine dose. Reports insurance won't permit her to fill more than 21-day supply at a time.    Exam  No LMP recorded. Patient has had an implant.    Patient verbally consented to telehealth visit.  Location of patient: [place]. Location of provider: office.  Start time of conversation: 5.06pm. End time of conversation: 5.24pm.  Billing based on complexity of encounter.

## 2023-11-26 ENCOUNTER — HEALTH MAINTENANCE LETTER (OUTPATIENT)
Age: 43
End: 2023-11-26

## 2023-12-22 ENCOUNTER — VIRTUAL VISIT (OUTPATIENT)
Dept: FAMILY MEDICINE | Facility: CLINIC | Age: 43
End: 2023-12-22
Payer: COMMERCIAL

## 2023-12-22 ENCOUNTER — LAB (OUTPATIENT)
Dept: FAMILY MEDICINE | Facility: CLINIC | Age: 43
End: 2023-12-22

## 2023-12-22 DIAGNOSIS — Z12.11 COLON CANCER SCREENING: ICD-10-CM

## 2023-12-22 DIAGNOSIS — E78.5 DYSLIPIDEMIA: ICD-10-CM

## 2023-12-22 DIAGNOSIS — F11.20 OPIOID DEPENDENCE ON AGONIST THERAPY (H): Primary | ICD-10-CM

## 2023-12-22 PROBLEM — N39.41 URGE INCONTINENCE OF URINE: Status: ACTIVE | Noted: 2023-12-22

## 2023-12-22 PROCEDURE — 99214 OFFICE O/P EST MOD 30 MIN: CPT | Mod: VID | Performed by: FAMILY MEDICINE

## 2023-12-22 RX ORDER — BUPRENORPHINE AND NALOXONE 4; 1 MG/1; MG/1
1 FILM, SOLUBLE BUCCAL; SUBLINGUAL DAILY
Qty: 30 FILM | Refills: 0 | Status: SHIPPED | OUTPATIENT
Start: 2023-12-22 | End: 2024-01-26

## 2023-12-22 RX ORDER — BUPRENORPHINE AND NALOXONE 8; 2 MG/1; MG/1
1 FILM, SOLUBLE BUCCAL; SUBLINGUAL 3 TIMES DAILY
Qty: 90 FILM | Refills: 0 | Status: SHIPPED | OUTPATIENT
Start: 2023-12-22 | End: 2024-01-26

## 2023-12-22 RX ORDER — ROSUVASTATIN CALCIUM 5 MG/1
5 TABLET, COATED ORAL DAILY
Qty: 90 TABLET | Refills: 3 | Status: SHIPPED | OUTPATIENT
Start: 2023-12-22 | End: 2024-07-16

## 2023-12-22 NOTE — PROGRESS NOTES
"Assessment/Plan - Video Appointment.    Oud. Sustained remission. Interested in suboxone taper.  -starting in January, erma will attempt to decrease from 28 mg/d to 24 mg/d. Ok to return to 28 mg/d if needed  -[place] prescription monitoring database reviewed today  -needs urine drug screen in near future    Colon cancer screening.  -cologuard    F/u in 1 month.    No orders of the defined types were placed in this encounter.    ----  Chief complaint: No chief complaint on file.    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 Not expected. Mi is suspected. Pt went down to [place] for  and to help alize. Ailze isn't doing well on his own. Might move up here.    Erma not doing great emotionally. Still in \"shock.\" Sad at times.  and kids are supportive, but pt doesn't want to burden them. Erma plans to reach out to psychiatrist at [facility] to discuss grief counseling options.    Has returned to work. 4 clients now.    Uti last week. Resolved w/ antibiotics.    Missed a few days of medication when in [place]. Now consistently taking again, including risperidone, statin, suboxone.    Takes 28 mg of suboxone most days. Sometimes only 24 mg/d. Interested in cutting down starting in January. Denies adverse effects, just feels like it's time.    Occasional cannabis use. Has cut down since mom . Denies recent etoh or illicit drug use.    Had preventive visit w/ obgyn. Colonoscopy was recommended, but erma would prefer to avoid this. No prior colonoscopy. No fh colorectal cancer. No blood in stool recently.    Exam  No LMP recorded. Patient has had an implant.  Affect normal.    Patient verbally consented to telehealth visit.  Location of patient: [place]. Location of provider: [place].  Start time of conversation: 7:37am. End time of " conversation: 8:04am.  Billing based on complexity of encounter.

## 2024-01-26 ENCOUNTER — VIRTUAL VISIT (OUTPATIENT)
Dept: FAMILY MEDICINE | Facility: CLINIC | Age: 44
End: 2024-01-26
Payer: COMMERCIAL

## 2024-01-26 ENCOUNTER — LAB (OUTPATIENT)
Dept: LAB | Facility: CLINIC | Age: 44
End: 2024-01-26
Payer: COMMERCIAL

## 2024-01-26 DIAGNOSIS — F29 PSYCHOSIS, UNSPECIFIED PSYCHOSIS TYPE (H): Primary | ICD-10-CM

## 2024-01-26 DIAGNOSIS — F11.20 OPIOID DEPENDENCE ON AGONIST THERAPY (H): ICD-10-CM

## 2024-01-26 DIAGNOSIS — F41.8 DEPRESSION WITH ANXIETY: ICD-10-CM

## 2024-01-26 LAB
AMPHETAMINES UR QL: NOT DETECTED
BARBITURATES UR QL SCN: NOT DETECTED
BENZODIAZ UR QL SCN: NOT DETECTED
BUPRENORPHINE UR QL: DETECTED
CANNABINOIDS UR QL: DETECTED
COCAINE UR QL SCN: NOT DETECTED
D-METHAMPHET UR QL: NOT DETECTED
METHADONE UR QL SCN: NOT DETECTED
OPIATES UR QL SCN: NOT DETECTED
OXYCODONE UR QL SCN: NOT DETECTED
PCP UR QL SCN: NOT DETECTED
TRICYCLICS UR QL SCN: NOT DETECTED

## 2024-01-26 PROCEDURE — 99214 OFFICE O/P EST MOD 30 MIN: CPT | Mod: 95 | Performed by: FAMILY MEDICINE

## 2024-01-26 PROCEDURE — 80306 DRUG TEST PRSMV INSTRMNT: CPT

## 2024-01-26 RX ORDER — BUPRENORPHINE AND NALOXONE 8; 2 MG/1; MG/1
1 FILM, SOLUBLE BUCCAL; SUBLINGUAL 3 TIMES DAILY
Qty: 90 FILM | Refills: 1 | Status: SHIPPED | OUTPATIENT
Start: 2024-01-26 | End: 2024-03-14

## 2024-01-26 RX ORDER — BUPRENORPHINE AND NALOXONE 4; 1 MG/1; MG/1
1 FILM, SOLUBLE BUCCAL; SUBLINGUAL DAILY
Qty: 30 FILM | Refills: 1 | Status: SHIPPED | OUTPATIENT
Start: 2024-01-26 | End: 2024-03-14

## 2024-01-26 NOTE — PROGRESS NOTES
"Assessment/Plan - Video Appointment.    Opioid use disorder.  -Continue buprenorphine at 28 mg daily  -[place] prescription monitoring database reviewed today  -Urine drug screen today.  Patient to leave sample at [place]    Depression with anxiety, grief, history of psychosis.  -Follow-up with psychiatry at [facility] as planned  -Patient declines referral for behavioral therapy today.  She plans to discuss further with psychiatrist    Cramping of hands.  Cause unclear.  -Examine hands at next office visit    Follow-up in 1 month, office or video visit.    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  Robin was challenging.  Worries about stepfather, who continues to live in [place].  He may moved to [place] after retiring in May.    Patient is scheduled to see psychiatrist at [facility] next week.    No medication changes since last visit.    At last visit, we discussed trying to decrease buprenorphine from 28 mg daily to 24 mg daily.  Patient has been taking 28 mg most days.  She feels that the additional 4 mg helps with cravings and \"just do not care\" attitude.    Denies alcohol or illicit drug use since last visit, with the exception of cannabis.  Has been using cannabis a couple times per week.  Patient denies recent increase in cannabis use frequency.    Hand cramps.  Alternates sides.  Most noticeable later in the day.  No associated swelling or numbness.    Exam  No LMP recorded. Patient has had an implant.  Affect normal.    Patient verbally consented to telehealth visit.  Location of patient: [place]. Location of provider: [place].  Start time of conversation: 3.21pm. End time of conversation: " 3.41pm.  Billing based on complexity of encounter.

## 2024-01-27 PROBLEM — L59.0 ERYTHEMA AB IGNE: Status: RESOLVED | Noted: 2023-06-20 | Resolved: 2024-01-27

## 2024-01-27 PROBLEM — F11.20 OPIOID DEPENDENCE ON AGONIST THERAPY (H): Status: ACTIVE | Noted: 2022-12-20

## 2024-01-27 PROBLEM — F11.20 CONTINUOUS OPIOID DEPENDENCE (H): Status: RESOLVED | Noted: 2023-07-08 | Resolved: 2024-01-27

## 2024-02-02 LAB — NONINV COLON CA DNA+OCC BLD SCRN STL QL: NEGATIVE

## 2024-03-14 DIAGNOSIS — F11.20 OPIOID DEPENDENCE ON AGONIST THERAPY (H): ICD-10-CM

## 2024-03-14 RX ORDER — BUPRENORPHINE AND NALOXONE 4; 1 MG/1; MG/1
1 FILM, SOLUBLE BUCCAL; SUBLINGUAL DAILY
Qty: 30 FILM | Refills: 0 | Status: SHIPPED | OUTPATIENT
Start: 2024-03-14 | End: 2024-04-02

## 2024-03-14 RX ORDER — BUPRENORPHINE AND NALOXONE 8; 2 MG/1; MG/1
1 FILM, SOLUBLE BUCCAL; SUBLINGUAL 3 TIMES DAILY
Qty: 90 FILM | Refills: 0 | Status: SHIPPED | OUTPATIENT
Start: 2024-03-14 | End: 2024-04-04

## 2024-04-12 ENCOUNTER — VIRTUAL VISIT (OUTPATIENT)
Dept: FAMILY MEDICINE | Facility: CLINIC | Age: 44
End: 2024-04-12
Payer: COMMERCIAL

## 2024-04-12 DIAGNOSIS — F41.8 DEPRESSION WITH ANXIETY: ICD-10-CM

## 2024-04-12 DIAGNOSIS — F17.210 CIGARETTE SMOKER: ICD-10-CM

## 2024-04-12 DIAGNOSIS — F11.20 OPIOID DEPENDENCE ON AGONIST THERAPY (H): Primary | ICD-10-CM

## 2024-04-12 PROCEDURE — 99214 OFFICE O/P EST MOD 30 MIN: CPT | Mod: 95 | Performed by: FAMILY MEDICINE

## 2024-04-12 RX ORDER — BUPRENORPHINE AND NALOXONE 8; 2 MG/1; MG/1
1 FILM, SOLUBLE BUCCAL; SUBLINGUAL 3 TIMES DAILY
Qty: 90 FILM | Refills: 0 | Status: SHIPPED | OUTPATIENT
Start: 2024-05-01 | End: 2024-06-04

## 2024-04-12 RX ORDER — BUPRENORPHINE AND NALOXONE 4; 1 MG/1; MG/1
1 FILM, SOLUBLE BUCCAL; SUBLINGUAL DAILY
Qty: 30 FILM | Refills: 0 | Status: SHIPPED | OUTPATIENT
Start: 2024-05-01 | End: 2024-06-04

## 2024-04-12 ASSESSMENT — ANXIETY QUESTIONNAIRES
8. IF YOU CHECKED OFF ANY PROBLEMS, HOW DIFFICULT HAVE THESE MADE IT FOR YOU TO DO YOUR WORK, TAKE CARE OF THINGS AT HOME, OR GET ALONG WITH OTHER PEOPLE?: SOMEWHAT DIFFICULT
GAD7 TOTAL SCORE: 5
5. BEING SO RESTLESS THAT IT IS HARD TO SIT STILL: SEVERAL DAYS
3. WORRYING TOO MUCH ABOUT DIFFERENT THINGS: NOT AT ALL
IF YOU CHECKED OFF ANY PROBLEMS ON THIS QUESTIONNAIRE, HOW DIFFICULT HAVE THESE PROBLEMS MADE IT FOR YOU TO DO YOUR WORK, TAKE CARE OF THINGS AT HOME, OR GET ALONG WITH OTHER PEOPLE: SOMEWHAT DIFFICULT
6. BECOMING EASILY ANNOYED OR IRRITABLE: SEVERAL DAYS
4. TROUBLE RELAXING: MORE THAN HALF THE DAYS
GAD7 TOTAL SCORE: 5
7. FEELING AFRAID AS IF SOMETHING AWFUL MIGHT HAPPEN: NOT AT ALL
7. FEELING AFRAID AS IF SOMETHING AWFUL MIGHT HAPPEN: NOT AT ALL
1. FEELING NERVOUS, ANXIOUS, OR ON EDGE: SEVERAL DAYS
2. NOT BEING ABLE TO STOP OR CONTROL WORRYING: NOT AT ALL

## 2024-04-12 NOTE — PROGRESS NOTES
Assessment/Plan - Video Appointment.    Opioid use disorder.  -Continue buprenorphine at 28 mg daily  -[place] prescription monitoring database reviewed today  -Urine drug screen today.  Patient to leave sample at [facility]    Depression with anxiety, grief, history of psychosis  -Establish care with therapist as planned.  Pt considering [facility] near her house  -Follow-up with psychiatry at [facility] as planned    Smoking.  -Erma and  will work toward quitting    Follow-up in 2 months.      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  Fatigue, nausea, low appetite, anxiety.  No fever.  She went to the ER, but the wait was too long, so she returned home.  No recent sick contacts.  Now starting to feel better.    Grief symptoms come and go.  Patient feels that she is able to talk with her  and kids about her emotions.  She is considering starting behavioral therapy.  She continues to worry about her stepfather, who is living alone in [place].    No medication changes since last visit.  Happy with current buprenorphine dose.  Denies recent opioid cravings.  Denies buprenorphine adverse effects.  Denies recent alcohol or illicit drug use.  Continues to use cannabis 2-3 times per week.  Helps with appetite.  Also uses socially.  Denies adverse effects.    Smoking 1/2 pack/day.  Patient thinks that she will be able to quit if  stop smoking.  He is taking Chantix.    Exam  No LMP recorded. Patient has had an implant.  Affect normal.    Patient verbally consented to telehealth visit.  Location of patient: [place]. Location of provider: [place].  Start time of conversation: 7.33am. End time of conversation: 7.53am.  Billing based on complexity of encounter.

## 2024-04-13 PROBLEM — E78.00 HIGH CHOLESTEROL: Status: ACTIVE | Noted: 2022-12-20

## 2024-04-13 PROBLEM — F17.210 CIGARETTE SMOKER: Status: ACTIVE | Noted: 2022-12-20

## 2024-05-01 ENCOUNTER — MYC MEDICAL ADVICE (OUTPATIENT)
Dept: FAMILY MEDICINE | Facility: CLINIC | Age: 44
End: 2024-05-01
Payer: COMMERCIAL

## 2024-06-07 ENCOUNTER — OFFICE VISIT (OUTPATIENT)
Dept: FAMILY MEDICINE | Facility: CLINIC | Age: 44
End: 2024-06-07
Payer: COMMERCIAL

## 2024-06-07 VITALS
RESPIRATION RATE: 18 BRPM | OXYGEN SATURATION: 98 % | TEMPERATURE: 97.9 F | HEIGHT: 64 IN | HEART RATE: 76 BPM | DIASTOLIC BLOOD PRESSURE: 78 MMHG | WEIGHT: 180.5 LBS | BODY MASS INDEX: 30.81 KG/M2 | SYSTOLIC BLOOD PRESSURE: 116 MMHG

## 2024-06-07 DIAGNOSIS — F17.210 CIGARETTE SMOKER: ICD-10-CM

## 2024-06-07 DIAGNOSIS — Z13.29 SCREENING FOR THYROID DISORDER: ICD-10-CM

## 2024-06-07 DIAGNOSIS — R53.83 FATIGUE, UNSPECIFIED TYPE: Primary | ICD-10-CM

## 2024-06-07 DIAGNOSIS — E55.9 VITAMIN D DEFICIENCY: ICD-10-CM

## 2024-06-07 DIAGNOSIS — Z13.0 SCREENING FOR DEFICIENCY ANEMIA: ICD-10-CM

## 2024-06-07 DIAGNOSIS — Z13.1 SCREENING FOR DIABETES MELLITUS: ICD-10-CM

## 2024-06-07 DIAGNOSIS — E78.00 HIGH CHOLESTEROL: ICD-10-CM

## 2024-06-07 DIAGNOSIS — L59.0 ERYTHEMA AB IGNE: ICD-10-CM

## 2024-06-07 DIAGNOSIS — Z13.220 LIPID SCREENING: ICD-10-CM

## 2024-06-07 DIAGNOSIS — F11.20 OPIOID DEPENDENCE ON AGONIST THERAPY (H): ICD-10-CM

## 2024-06-07 PROCEDURE — 90471 IMMUNIZATION ADMIN: CPT | Performed by: FAMILY MEDICINE

## 2024-06-07 PROCEDURE — 90480 ADMN SARSCOV2 VAC 1/ONLY CMP: CPT | Performed by: FAMILY MEDICINE

## 2024-06-07 PROCEDURE — 91320 SARSCV2 VAC 30MCG TRS-SUC IM: CPT | Performed by: FAMILY MEDICINE

## 2024-06-07 PROCEDURE — 90715 TDAP VACCINE 7 YRS/> IM: CPT | Performed by: FAMILY MEDICINE

## 2024-06-07 PROCEDURE — 99214 OFFICE O/P EST MOD 30 MIN: CPT | Mod: 25 | Performed by: FAMILY MEDICINE

## 2024-06-07 RX ORDER — BUPRENORPHINE AND NALOXONE 8; 2 MG/1; MG/1
1 FILM, SOLUBLE BUCCAL; SUBLINGUAL 3 TIMES DAILY
Qty: 90 FILM | Refills: 0 | Status: SHIPPED | OUTPATIENT
Start: 2024-07-04 | End: 2024-07-24

## 2024-06-07 RX ORDER — BUPRENORPHINE AND NALOXONE 4; 1 MG/1; MG/1
1 FILM, SOLUBLE BUCCAL; SUBLINGUAL DAILY
Qty: 30 FILM | Refills: 0 | Status: SHIPPED | OUTPATIENT
Start: 2024-07-04 | End: 2024-07-26

## 2024-06-07 ASSESSMENT — PAIN SCALES - GENERAL: PAINLEVEL: NO PAIN (0)

## 2024-06-07 NOTE — PROGRESS NOTES
"Assessment/Plan.    Fatigue. Broad differential. Could be 2/2 psych meds.  -labs as below  -consider BLANQUITA screening    OUD. Sustained remission. Erma interested in cutting down on Suboxone dose and she appears to be tolerating gradual taper.  -ok to continue 24-28 mg/d of buprenorphine  -[place] prescription monitoring database reviewed today    Mood disorder.  -follow up with psychiatry at [facility]  -Erma plans to start therapy through [facility] near her house    Cigarettes. Down to 1/2 ppd.  also trying to quit.  -continue NRT prn    Erythema ab igne per dermatology e-consult in 2023. I am surprised by persistence of symptoms. Erma concerned for alternative diagnosis.  -refer to vascular    Next visit in 1 month for OUD.    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 Training for police position w/ [employer].    Alize not doing great. In [place]. Retires in July.    Erma's fatigue persists. Not depressed. Low motivation.    Using less Suboxone. 24 mg most days. 28 mg about 2d/wk if cravings or low energy.    Diagnosed with erythema ab igne last year. Popliteal area b/l. Persistent. Not painful. No hx DVT.    No other med changes since last visit.    Exam  /78 (BP Location: Right arm, Patient Position: Sitting, Cuff Size: Adult Large)   Pulse 76   Temp 97.9  F (36.6  C) (Temporal)   Resp 18   Ht 1.631 m (5' 4.21\")   Wt 81.9 kg " (180 lb 8 oz)   LMP  (LMP Unknown)   SpO2 98%   BMI 30.78 kg/m      Reticular ecchymosis of popliteal areas b/l, does not rodo  1+ lower leg edema b/l

## 2024-06-07 NOTE — PROGRESS NOTES
"  {PROVIDER CHARTING PREFERENCE:439953}    Fanny Ritchie is a 43 year old, presenting for the following health issues:  Recheck Medication      6/7/2024     2:34 PM   Additional Questions   Roomed by Leti CHO     History of Present Illness       Reason for visit:  Follow up    She eats 2-3 servings of fruits and vegetables daily.She consumes 2 sweetened beverage(s) daily.She exercises with enough effort to increase her heart rate 20 to 29 minutes per day.  She exercises with enough effort to increase her heart rate 3 or less days per week.   She is taking medications regularly.       {MA/LPN/RN Pre-Provider Visit Orders- hCG/UA/Strep (Optional):747852}  {SUPERLIST (Optional):309178}  {additonal problems for provider to add (Optional):503160}    {ROS Picklists (Optional):302724}      Objective    /78 (BP Location: Right arm, Patient Position: Sitting, Cuff Size: Adult Large)   Pulse 76   Temp 97.9  F (36.6  C) (Temporal)   Resp 18   Ht 1.631 m (5' 4.21\")   Wt 81.9 kg (180 lb 8 oz)   LMP  (LMP Unknown)   SpO2 98%   BMI 30.78 kg/m    Body mass index is 30.78 kg/m .  Physical Exam   {Exam List (Optional):484592}    {Diagnostic Test Results (Optional):107194}        Signed Electronically by: Vicente Vaughn MD  {Email feedback regarding this note to [email or web address]   :[id number]}  "

## 2024-06-09 PROBLEM — L59.0 ERYTHEMA AB IGNE: Status: ACTIVE | Noted: 2023-06-20

## 2024-06-10 ENCOUNTER — TELEPHONE (OUTPATIENT)
Dept: VASCULAR SURGERY | Facility: CLINIC | Age: 44
End: 2024-06-10
Payer: COMMERCIAL

## 2024-06-10 NOTE — TELEPHONE ENCOUNTER
Referral received via [employer] on 06/10/24.    Pt referred to [facility] by Vicente Vaughn MD for Erythema ab igne -due to persistence, consider alternative diagnosis.    Routing to scheduling to coordinate the following:    NEW VASCULAR PATIENT consult with Vascular Medicine  Please schedule this at next available    Appt note:    Pt referred to [facility] by Vicente Vaughn MD for Erythema ab igne -due to persistence, consider alternative diagnosis.

## 2024-06-10 NOTE — TELEPHONE ENCOUNTER
6-10-24 Cecily CHRISTIANSON and I looked at pts chart and pictures and believe pt needs to be seen at [facility]; lvm letting pt know of this, along with their and our phone number; I transferred this referral today to [facility] [id number]. sm

## 2024-06-12 NOTE — TELEPHONE ENCOUNTER
Left voicemail for patient to schedule appointment(s), stated this our 2nd attempt at scheduling and provided [facility] telephone number for patient to call back to schedule.

## 2024-07-11 ENCOUNTER — OFFICE VISIT (OUTPATIENT)
Dept: OTHER | Facility: CLINIC | Age: 44
End: 2024-07-11
Attending: INTERNAL MEDICINE
Payer: COMMERCIAL

## 2024-07-11 VITALS
HEART RATE: 75 BPM | HEIGHT: 65 IN | WEIGHT: 179.6 LBS | SYSTOLIC BLOOD PRESSURE: 117 MMHG | DIASTOLIC BLOOD PRESSURE: 77 MMHG | OXYGEN SATURATION: 97 % | BODY MASS INDEX: 29.92 KG/M2

## 2024-07-11 DIAGNOSIS — L59.0 ERYTHEMA AB IGNE: Primary | ICD-10-CM

## 2024-07-11 DIAGNOSIS — F17.219 CIGARETTE NICOTINE DEPENDENCE WITH NICOTINE-INDUCED DISORDER: ICD-10-CM

## 2024-07-11 DIAGNOSIS — E78.5 HYPERLIPIDEMIA LDL GOAL <70: ICD-10-CM

## 2024-07-11 PROCEDURE — 99406 BEHAV CHNG SMOKING 3-10 MIN: CPT | Performed by: INTERNAL MEDICINE

## 2024-07-11 PROCEDURE — 99213 OFFICE O/P EST LOW 20 MIN: CPT | Performed by: INTERNAL MEDICINE

## 2024-07-11 PROCEDURE — G2211 COMPLEX E/M VISIT ADD ON: HCPCS | Performed by: INTERNAL MEDICINE

## 2024-07-11 PROCEDURE — 99205 OFFICE O/P NEW HI 60 MIN: CPT | Performed by: INTERNAL MEDICINE

## 2024-07-11 NOTE — PROGRESS NOTES
"Patient is here to discuss consult    /82 (BP Location: Right arm, Patient Position: Chair, Cuff Size: Adult Regular)   Pulse 72   Ht 5' 5\" (1.651 m)   Wt 179 lb 9.6 oz (81.5 kg)   LMP  (LMP Unknown)   SpO2 97%   BMI 29.89 kg/m      Questions patient would like addressed today are: N/A.    Refills are needed: No    Has homecare services and agency name:  [facility]"

## 2024-07-11 NOTE — PROGRESS NOTES
[facility] INITIAL VASCULAR MEDICINE CONSULT    ( New patient Visit )     PRIMARY HEALTH CARE PROVIDER:  Vicente Vaughn MD      REFERRING HEALTH CARE PROVIDER;  Vicente Vaughn MD      REASON FOR CONSULT: Evaluation and management of fish net like rash behind both knees for almost 2 years.      HPI: Erma Gregory is a 43 year old female accompanied by her  with past medical history of a bipolar disorder nicotine dependence , schizoaffective schizophrenia, hyperlipidemia here for evaluation and management of vascular causes of rash behind the both knees for approximately 2 years ago.  No pain no itching.  Before she noticed rash she had a long car trip to [place] and used heated car seats.  She also stands in front of the [employer] heater exactly the height of her knees and thigh area.  She has been smoking 1 pack cigarettes daily for many years.  She denies any joint aches or pains.  No miscarriages she has a 2 living children.  No new medications  No history of a DVT or PE or arterial thromboembolism  No history of malignancy  She is new to me reviewed available records in the epic and updated chart      PAST MEDICAL HISTORY  Past Medical History:   Diagnosis Date    Bipolar 1 disorder, depressed (H)     Combinations of drug dependence excluding opioid type drug, in remission (H)     in methadone program    Diabetes (H)     Erythema ab igne     Schizo affective schizophrenia (H)        CURRENT MEDICATIONS  Current Outpatient Medications   Medication Sig Dispense Refill    buprenorphine HCl-naloxone HCl (SUBOXONE) 4-1 MG per film Place 1 Film under the tongue daily Total daily buprenorphine dose is 28 mg. 30 Film 0    buprenorphine HCl-naloxone HCl (SUBOXONE) 8-2 MG per film Place 1 Film under the tongue 3 times daily Total daily buprenorphine dose is 28 mg. 90 Film 0    cyclobenzaprine (FLEXERIL) 10 MG tablet Take 0.5-1 tablets (5-10 mg) by mouth 2 times daily as needed for  muscle spasms 30 tablet 0    etonogestrel (IMPLANON/NEXPLANON) 68 MG IMPL 1 each by Subdermal route once      nicotine (NICODERM CQ) 21 MG/24HR 24 hr patch Place 1 patch onto the skin every 24 hours 30 patch 2    QUEtiapine (SEROQUEL XR) 50 MG TB24 24 hr tablet Take by mouth nightly as needed for sleep      risperiDONE (RISPERDAL) 4 MG tablet Take 1 tablet (4 mg) by mouth at bedtime 30 tablet 0    rosuvastatin (CRESTOR) 5 MG tablet Take 1 tablet (5 mg) by mouth daily 90 tablet 3     No current facility-administered medications for this visit.       PAST SURGICAL HISTORY:  Past Surgical History:   Procedure Laterality Date    NO HISTORY OF SURGERY         ALLERGIES     Allergies   Allergen Reactions    Aripiprazole      Other reaction(s): Tremors  Restless legs and bilateral leg weakness.      Morphine And Codeine Other (See Comments)     agitation    Promethazine-Dm Nausea and Vomiting     agitation    Sulfamethoxazole-Trimethoprim Nausea and Vomiting       FAMILY HISTORY  Family History   Problem Relation Age of Onset    Cancer Mother     Coronary Artery Disease Mother         bypass age 53    Hypertension Mother     Bipolar Disorder Father     Substance Abuse Father     Cancer Maternal Grandmother     Heart Disease Paternal Grandmother     Substance Abuse Paternal Grandmother     Hypertension Paternal Grandmother     Colorectal Cancer No family hx of        VASCULAR FAMILY HISTORY  1st order relative with atherosclerotic PAD: No  1st order relative with AAA: No  Family history of Familial Hyperlipidemia No  Family History of Hypercoagulable state:No    VASCULAR RISK FACTORS  1. Diabetes:No   2. Smoking: currently smokes.  Advised about smoking cessation.  3. HTN: normotensive  4.Hyperlipidemia: Yes - uncontrolled      SOCIAL HISTORY  Social History     Socioeconomic History    Marital status:      Spouse name: Not on file    Number of children: Not on file    Years of education: Not on file    Highest  education level: Not on file   Occupational History    Not on file   Tobacco Use    Smoking status: Every Day     Current packs/day: 0.50     Average packs/day: 0.5 packs/day for 8.0 years (4.0 ttl pk-yrs)     Types: Cigarettes    Smokeless tobacco: Never   Vaping Use    Vaping status: Never Used   Substance and Sexual Activity    Alcohol use: Not Currently     Comment: 1 week ago    Drug use: Yes     Types: Marijuana     Comment: unsure when last used is in remission from drugs    Sexual activity: Yes     Partners: Male     Birth control/protection: Implant   Other Topics Concern    Parent/sibling w/ CABG, MI or angioplasty before 65F 55M? 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/22/2023     Social Determinants of Health     Financial Resource Strain: Not on file   Food Insecurity: Not on file   Transportation Needs: Not on file   Physical Activity: Not on file   Stress: Not on file   Social Connections: Not on file   Interpersonal Safety: Low Risk  (6/7/2024)    Interpersonal Safety     Do you feel physically and emotionally safe where you currently live?: Yes     Within the past 12 months, have you been hit, slapped, kicked or otherwise physically hurt by someone?: No     Within the past 12 months, have you been humiliated or emotionally abused in other ways by your partner or ex-partner?: No   Housing Stability: Not on file       ROS:   General: No change in weight, sleep or appetite.  Normal energy.  No fever or chills  Eyes: Negative for vision changes or eye problems  ENT: No problems with ears, nose or throat.  No difficulty swallowing.  Resp: No coughing, wheezing or shortness of breath  CV: No chest pains or palpitations  GI: No nausea, vomiting,  heartburn, abdominal pain, diarrhea, constipation or change in bowel habits  : No urinary frequency or dysuria, bladder or kidney problems  Musculoskeletal: No  "significant muscle or joint pains  Neurologic: No headaches, numbness, tingling, weakness, problems with balance or coordination  Psychiatric: No problems with anxiety, depression or mental health  Heme/immune/allergy: No history of bleeding or clotting problems or anemia.  No allergies or immune system problems  Endocrine: No history of thyroid disease, diabetes or other endocrine disorders  Skin: No rashes,worrisome lesions or skin problems  Vascular:  No claudication, lifestyle limiting or otherwise; no ischemic rest pain; no non-healing ulcers. No weakness, No loss of sensation    Rash behind both knees fishnet like appearance    EXAM:  /77 (BP Location: Left arm, Patient Position: Chair, Cuff Size: Adult Regular)   Pulse 75   Ht 5' 5\" (1.651 m)   Wt 179 lb 9.6 oz (81.5 kg)   LMP  (LMP Unknown)   SpO2 97%   BMI 29.89 kg/m    In general, the patient is a pleasant female in no apparent distress.    HEENT: NC/AT.  PERRLA.  EOMI.  Sclerae white, not injected.  Nares clear.  Pharynx without erythema or exudate.  Dentition intact.    Neck: No adenopathy.  No thyromegaly. Carotids +2/2 bilaterally without bruits.  No jugular venous distension.   Heart: RRR. Normal S1, S2 splits physiologically. No murmur, rub, click, or gallop. The PMI is in the 5th ICS in the midclavicular line. There is no heave.    Lungs: CTA.  No ronchi, wheezes, rales.  No dullness to percussion.   Abdomen: Soft, nontender, nondistended. No organomegaly. No AAA.  No bruits.   Extremities: No clubbing, cyanosis, or edema.  No wounds.   Clinically looks like erythema ab igne behind the knee area  Does not look like livedo racemosa  But it does have appearance of livedo reticularis  No leg swelling  Good palpable peripheral pulses  No varicose veins or venous insufficiency      Labs:  LIPID RESULTS:  Lab Results   Component Value Date    CHOL 288 (H) 02/15/2019    CHOL 314 (H) 02/13/2012    HDL 36 (L) 02/15/2019    HDL 38 02/13/2012    "  (H) 02/15/2019     02/13/2012    TRIG 119 02/15/2019    TRIG 147 02/13/2012       LIVER ENZYME RESULTS:  Lab Results   Component Value Date    AST 6 10/10/2019    ALT 15 10/10/2019       CBC RESULTS:  Lab Results   Component Value Date    WBC 7.0 10/10/2019    RBC 4.71 10/10/2019    HGB 13.8 10/10/2019    HCT 40.8 10/10/2019    MCV 87 10/10/2019    MCH 29.3 10/10/2019    MCHC 33.8 10/10/2019    RDW 12.6 10/10/2019     10/10/2019       BMP RESULTS:  Lab Results   Component Value Date     10/10/2019    POTASSIUM 3.8 10/10/2019    CHLORIDE 106 10/10/2019    CO2 25 10/10/2019    ANIONGAP 8 10/10/2019    GLC 91 10/10/2019    BUN 17 10/10/2019    CR 0.80 10/10/2019    GFRESTIMATED >90 10/10/2019    GFRESTBLACK >90 10/10/2019    SULTNAA 8.6 10/10/2019        A1C RESULTS:  Lab Results   Component Value Date    A1C 5.3 02/13/2012       THYROID RESULTS:  Lab Results   Component Value Date    TSH 1.53 10/10/2019       Procedures:       Assessment and Plan:     1. Erythema ab igne ( Bilateral popliteal area)  - Vascular Surgery Referral  - Cardiolipin Miriam IgG and IgM; Future  - Lupus Anticoagulant Panel; Future  - Beta 2 Glycoprotein Antibodies IGG IGM; Future  - Anti Nuclear Miriam IgG by IFA with Reflex; Future  - Rheumatoid factor; Future  - Erythrocyte sedimentation rate auto; Future  - CRP inflammation; Future    2. Cigarette nicotine dependence with nicotine-induced disorder  - SMOKING CESSATION COUNSELING, 3-10 MIN    3. Hyperlipidemia LDL goal <70    This is a very pleasant 43-year-old female accompanied by her  today for evaluation and management of rash behind the knee area popliteal space and also distal thigh area.  This clinically appears erythema ab igne.  Prior to developing the rash she has a long distance car to [place] and used heated car seats  She also spends a lot of time in front of fireplace heater which is about the height of her knees.  No systemic symptoms of arthralgia,  joint aches or pains, no other skin rash.  She has no miscarriages no history of arterial or venous thromboembolism.  Erythema ab igne is usually caused by prolonged exposure to heat, prolonged use of hot water bottles, heating pads, electric blankets and sitting or standing near the fireplace or wood stove etc..  In her case traveled in a heated car seats prior to developing this and also spending a lot of time in front of the fireplace heater.  Usually no workup needed but she has some leg pain.  This persisted almost 2 years    I will obtain APLA testing, inflammatory markers, JACKELINE and rheumatoid factor if they come back negative no further testing needed  Avoid exposure to excessive heat specifically avoid standing next to fireplace heater.    Virtual visit 2 weeks after the lab tests  She smokes more than a pack cigarettes daily strongly advised her to quit smoking offered help pills, patches, gum she has a patches at home she wanted to use and quit .    More than 3 minutes of smoking cessation counseling was done  For hyperlipidemia she takes low-dose rosuvastatin and she is scheduled to undergo lipid panel  Follow diet exercise       64 minutes spent on the date of the encounter doing chart review, history and exam, documentation, and further activities as noted above.  She is new to me reviewed available records in the epic and updated chart    The longitudinal care of plan for the above diagnoses was addressed during this visit. Due to added complexity of care, we will continue to supprt Erma Gregory and the subsequent management of this/these conditions and with ongoing continuity of care for this/these conditions.     AVS with written instructions given  For the consultation  This note was dictated by utilizing dragon software  Copy of this note to primary care physician    Lisa Garcia MD,FAEFREN,FSVM,FNLA, FACP  Vascular Medicine  Clinical Hypertension Specialist   Clinical Lipidologist

## 2024-07-11 NOTE — PATIENT INSTRUCTIONS
Your skin lesion possibilities are,     Erythema ab igne or Livido reticularis or Livedo racemosa etc     Avoid heating pads , standing next to fire place heater etc    Go for labs order placed     Virtual visit in 1-2 weeks     Quit smoking

## 2024-07-12 ENCOUNTER — TELEPHONE (OUTPATIENT)
Dept: OTHER | Facility: CLINIC | Age: 44
End: 2024-07-12

## 2024-07-12 ENCOUNTER — LAB (OUTPATIENT)
Dept: LAB | Facility: OTHER | Age: 44
End: 2024-07-12
Payer: COMMERCIAL

## 2024-07-12 DIAGNOSIS — Z13.220 LIPID SCREENING: ICD-10-CM

## 2024-07-12 DIAGNOSIS — Z13.0 SCREENING FOR DEFICIENCY ANEMIA: ICD-10-CM

## 2024-07-12 DIAGNOSIS — Z13.1 SCREENING FOR DIABETES MELLITUS: ICD-10-CM

## 2024-07-12 DIAGNOSIS — E55.9 VITAMIN D DEFICIENCY: ICD-10-CM

## 2024-07-12 DIAGNOSIS — E78.5 HYPERLIPIDEMIA LDL GOAL <70: Primary | ICD-10-CM

## 2024-07-12 DIAGNOSIS — E78.00 HIGH CHOLESTEROL: Primary | ICD-10-CM

## 2024-07-12 DIAGNOSIS — L59.0 ERYTHEMA AB IGNE: ICD-10-CM

## 2024-07-12 DIAGNOSIS — Z13.29 SCREENING FOR THYROID DISORDER: ICD-10-CM

## 2024-07-12 LAB
ANION GAP SERPL CALCULATED.3IONS-SCNC: 11 MMOL/L (ref 7–15)
BUN SERPL-MCNC: 7.8 MG/DL (ref 6–20)
CALCIUM SERPL-MCNC: 9.3 MG/DL (ref 8.6–10)
CHLORIDE SERPL-SCNC: 106 MMOL/L (ref 98–107)
CHOLEST SERPL-MCNC: 197 MG/DL
CREAT SERPL-MCNC: 0.7 MG/DL (ref 0.51–0.95)
CRP SERPL-MCNC: 4.13 MG/L
DEPRECATED HCO3 PLAS-SCNC: 22 MMOL/L (ref 22–29)
EGFRCR SERPLBLD CKD-EPI 2021: >90 ML/MIN/1.73M2
ERYTHROCYTE [DISTWIDTH] IN BLOOD BY AUTOMATED COUNT: 13 % (ref 10–15)
ERYTHROCYTE [SEDIMENTATION RATE] IN BLOOD BY WESTERGREN METHOD: 9 MM/HR (ref 0–20)
FASTING STATUS PATIENT QL REPORTED: NO
FASTING STATUS PATIENT QL REPORTED: NO
GLUCOSE SERPL-MCNC: 153 MG/DL (ref 70–99)
HBA1C MFR BLD: 5.8 % (ref 0–5.6)
HCT VFR BLD AUTO: 42.4 % (ref 35–47)
HDLC SERPL-MCNC: 34 MG/DL
HGB BLD-MCNC: 14.8 G/DL (ref 11.7–15.7)
LDLC SERPL CALC-MCNC: 135 MG/DL
MCH RBC QN AUTO: 29.9 PG (ref 26.5–33)
MCHC RBC AUTO-ENTMCNC: 34.9 G/DL (ref 31.5–36.5)
MCV RBC AUTO: 86 FL (ref 78–100)
NONHDLC SERPL-MCNC: 163 MG/DL
PLATELET # BLD AUTO: 280 10E3/UL (ref 150–450)
POTASSIUM SERPL-SCNC: 4.1 MMOL/L (ref 3.4–5.3)
RBC # BLD AUTO: 4.95 10E6/UL (ref 3.8–5.2)
RHEUMATOID FACT SERPL-ACNC: <10 IU/ML
SODIUM SERPL-SCNC: 139 MMOL/L (ref 135–145)
TRIGL SERPL-MCNC: 141 MG/DL
TSH SERPL DL<=0.005 MIU/L-ACNC: 1.22 UIU/ML (ref 0.3–4.2)
VIT D+METAB SERPL-MCNC: 19 NG/ML (ref 20–50)
WBC # BLD AUTO: 9.3 10E3/UL (ref 4–11)

## 2024-07-12 PROCEDURE — 85652 RBC SED RATE AUTOMATED: CPT

## 2024-07-12 PROCEDURE — 82306 VITAMIN D 25 HYDROXY: CPT

## 2024-07-12 PROCEDURE — 84443 ASSAY THYROID STIM HORMONE: CPT

## 2024-07-12 PROCEDURE — 80048 BASIC METABOLIC PNL TOTAL CA: CPT

## 2024-07-12 PROCEDURE — 86140 C-REACTIVE PROTEIN: CPT

## 2024-07-12 PROCEDURE — 86431 RHEUMATOID FACTOR QUANT: CPT

## 2024-07-12 PROCEDURE — 80061 LIPID PANEL: CPT

## 2024-07-12 PROCEDURE — 86038 ANTINUCLEAR ANTIBODIES: CPT

## 2024-07-12 PROCEDURE — 36415 COLL VENOUS BLD VENIPUNCTURE: CPT

## 2024-07-12 PROCEDURE — 85390 FIBRINOLYSINS SCREEN I&R: CPT | Performed by: PATHOLOGY

## 2024-07-12 PROCEDURE — 85027 COMPLETE CBC AUTOMATED: CPT

## 2024-07-12 PROCEDURE — 85730 THROMBOPLASTIN TIME PARTIAL: CPT

## 2024-07-12 PROCEDURE — 86146 BETA-2 GLYCOPROTEIN ANTIBODY: CPT

## 2024-07-12 PROCEDURE — 85613 RUSSELL VIPER VENOM DILUTED: CPT

## 2024-07-12 PROCEDURE — 86147 CARDIOLIPIN ANTIBODY EA IG: CPT

## 2024-07-12 PROCEDURE — 83036 HEMOGLOBIN GLYCOSYLATED A1C: CPT

## 2024-07-12 NOTE — TELEPHONE ENCOUNTER
Routing to scheduling to coordinate the following:    Non-fasting labs   Phone follow up 2 weeks after labs  Please schedule this at next available     Appt note: Follow up to 7/11/24    Marita LANE RN    [facility] [place]  Office: [phone]  Fax: [phone]

## 2024-07-12 NOTE — TELEPHONE ENCOUNTER
LVM for patient to return call to schedule virtual visit in 2wks. Patient had labs done on 07/12/24.

## 2024-07-15 LAB
ANA SER QL IF: NEGATIVE
B2 GLYCOPROT1 IGG SERPL IA-ACNC: <0.8 U/ML
B2 GLYCOPROT1 IGM SERPL IA-ACNC: <2.4 U/ML
CARDIOLIPIN IGG SER IA-ACNC: <2 GPL-U/ML
CARDIOLIPIN IGG SER IA-ACNC: NEGATIVE
CARDIOLIPIN IGM SER IA-ACNC: 2.4 MPL-U/ML
CARDIOLIPIN IGM SER IA-ACNC: NEGATIVE
DRVVT SCREEN RATIO: 0.82
INR PPP: 0.99 (ref 0.85–1.15)
LA PPP-IMP: NEGATIVE
LUPUS INTERPRETATION: NORMAL
PTT RATIO: 1.11
THROMBIN TIME: 16 SECONDS (ref 13–19)

## 2024-07-16 PROBLEM — R73.03 PREDIABETES: Status: ACTIVE | Noted: 2024-07-16

## 2024-07-16 PROBLEM — E55.9 VITAMIN D DEFICIENCY: Status: ACTIVE | Noted: 2024-07-16

## 2024-07-16 RX ORDER — FAMOTIDINE 20 MG
25 TABLET ORAL DAILY
Qty: 90 CAPSULE | Refills: 3 | Status: SHIPPED | OUTPATIENT
Start: 2024-07-16

## 2024-07-16 RX ORDER — ROSUVASTATIN CALCIUM 10 MG/1
10 TABLET, COATED ORAL DAILY
Qty: 90 TABLET | Refills: 3 | Status: SHIPPED | OUTPATIENT
Start: 2024-07-16

## 2024-07-16 NOTE — TELEPHONE ENCOUNTER
Left voicemail for patient to schedule appointment(s), stated this our 2nd attempt at scheduling and provided [facility] telephone number for patient to call back to schedule.

## 2024-08-30 ENCOUNTER — VIRTUAL VISIT (OUTPATIENT)
Dept: FAMILY MEDICINE | Facility: CLINIC | Age: 44
End: 2024-08-30
Payer: COMMERCIAL

## 2024-08-30 DIAGNOSIS — F11.20 OPIOID DEPENDENCE ON AGONIST THERAPY (H): Primary | ICD-10-CM

## 2024-08-30 DIAGNOSIS — R73.03 PREDIABETES: ICD-10-CM

## 2024-08-30 DIAGNOSIS — L59.0 ERYTHEMA AB IGNE: ICD-10-CM

## 2024-08-30 DIAGNOSIS — F17.210 CIGARETTE SMOKER: ICD-10-CM

## 2024-08-30 PROCEDURE — 99214 OFFICE O/P EST MOD 30 MIN: CPT | Mod: 95 | Performed by: FAMILY MEDICINE

## 2024-08-30 RX ORDER — METFORMIN HCL 500 MG
TABLET, EXTENDED RELEASE 24 HR ORAL
Qty: 60 TABLET | Refills: 2 | Status: SHIPPED | OUTPATIENT
Start: 2024-08-30 | End: 2024-09-29

## 2024-08-30 RX ORDER — BUPRENORPHINE AND NALOXONE 8; 2 MG/1; MG/1
1 FILM, SOLUBLE BUCCAL; SUBLINGUAL 3 TIMES DAILY
Qty: 90 FILM | Refills: 1 | Status: SHIPPED | OUTPATIENT
Start: 2024-09-22

## 2024-08-30 RX ORDER — BUPRENORPHINE AND NALOXONE 4; 1 MG/1; MG/1
1 FILM, SOLUBLE BUCCAL; SUBLINGUAL DAILY
Qty: 30 FILM | Refills: 1 | Status: SHIPPED | OUTPATIENT
Start: 2024-09-22

## 2024-08-30 NOTE — PROGRESS NOTES
Assessment/Plan - Video Visit.    OUD. Sustained remission.  -continue 24-28 mg/d of buprenorphine  -[place] prescription monitoring database reviewed today    Cigarettes. Trying to quit.  also trying to quit.  -continue NRT prn    Erythema ab igne.  Reassuring labs through Vascular.  Resolving.    Prediabetes.  -Discussed diet changes and exercise  -Start metformin.  Risk of diarrhea reviewed    Next visit in 2 months for OUD.      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  Concern for lung nodule, but repeat imaging showed no nodule.    Daughter recently graduated from [employer].  She will be working in [employer].    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  She enjoys working with all of them.  She does not have to work weekends.    Met with vascular specialist regarding erythema ab igne.  Had labs to look for systemic causes.  Daughter pointed out that patient had been standing by fireplace frequently.  Patient has stopped doing this and her symptoms are improving.    Statin dose was increased.  Denies adverse effects.    Struggling to cut down on cigarette use.  Considering starting to exercise when she has cravings.    Patient is happy with current buprenorphine dose.  Denies adverse effects.  Denies illicit drug use since last visit.  Restarted cannabis use, mainly at night.    Exam  Affect normal.    Start time of visit 7.04am.  End time 7.30am.  During visit, both patient and provider were located in [place].

## 2024-10-10 ENCOUNTER — OFFICE VISIT (OUTPATIENT)
Dept: PODIATRY | Facility: CLINIC | Age: 44
End: 2024-10-10
Payer: COMMERCIAL

## 2024-10-10 VITALS
HEIGHT: 65 IN | SYSTOLIC BLOOD PRESSURE: 126 MMHG | BODY MASS INDEX: 30.49 KG/M2 | WEIGHT: 183 LBS | DIASTOLIC BLOOD PRESSURE: 80 MMHG

## 2024-10-10 DIAGNOSIS — L60.8 PINCER NAIL DEFORMITY: Primary | ICD-10-CM

## 2024-10-10 DIAGNOSIS — F17.210 CIGARETTE SMOKER: ICD-10-CM

## 2024-10-10 PROCEDURE — 99203 OFFICE O/P NEW LOW 30 MIN: CPT | Performed by: PODIATRIST

## 2024-10-10 ASSESSMENT — PAIN SCALES - GENERAL: PAINLEVEL: NO PAIN (0)

## 2024-10-10 NOTE — LETTER
10/10/2024      Erma Gregory  [address] [address]      Dear Colleague,    Thank you for referring your patient, Erma Gregory, to the [facility]. Please see a copy of my visit note below.    HPI:  Erma Gregory is a 43 year old female who is seen in consultation at the request of self    Pt presents for eval of:   (Onset, Location, L/R, Character, Treatments, Injury if yes)    DM type 2     Ongoing pincer Left and Right toenails. Recent pedicure painful medial Right > Left great toenails.    Works doing home care.    ROS:  10 point ROS neg other than the symptoms noted above in the HPI.    Patient Active Problem List   Diagnosis     Depression with anxiety     Psychosis (H)     High cholesterol     Cigarette smoker     Opioid dependence on agonist therapy (H)     Erythema ab igne     Urge incontinence of urine     Vitamin D deficiency     Prediabetes       PAST MEDICAL HISTORY:   Past Medical History:   Diagnosis Date     Bipolar 1 disorder, depressed (H)      Combinations of drug dependence excluding opioid type drug, in remission (H)     in methadone program     Diabetes (H)      Erythema ab igne      Schizo affective schizophrenia (H)         PAST SURGICAL HISTORY:   Past Surgical History:   Procedure Laterality Date     NO HISTORY OF SURGERY          MEDICATIONS:   Current Outpatient Medications:      buprenorphine HCl-naloxone HCl (SUBOXONE) 4-1 MG per film, Place 1 Film under the tongue daily. Total daily buprenorphine dose is 28 mg. Do not start before September 22, 2024., Disp: 30 Film, Rfl: 1     buprenorphine HCl-naloxone HCl (SUBOXONE) 8-2 MG per film, Place 1 Film under the tongue 3 times daily. Total daily buprenorphine dose is 28 mg. Do not start before September 22, 2024., Disp: 90 Film, Rfl: 1     cyclobenzaprine (FLEXERIL) 10 MG tablet, Take 0.5-1 tablets (5-10 mg) by mouth 2 times daily as needed for muscle spasms, Disp: 30 tablet, Rfl: 0      etonogestrel (IMPLANON/NEXPLANON) 68 MG IMPL, 1 each by Subdermal route once, Disp: , Rfl:      nicotine (NICODERM CQ) 21 MG/24HR 24 hr patch, Place 1 patch onto the skin every 24 hours, Disp: 30 patch, Rfl: 2     QUEtiapine (SEROQUEL XR) 50 MG TB24 24 hr tablet, Take by mouth nightly as needed for sleep, Disp: , Rfl:      risperiDONE (RISPERDAL) 4 MG tablet, Take 1 tablet (4 mg) by mouth at bedtime, Disp: 30 tablet, Rfl: 0     rosuvastatin (CRESTOR) 10 MG tablet, Take 1 tablet (10 mg) by mouth daily, Disp: 90 tablet, Rfl: 3     Vitamin D, Cholecalciferol, 25 MCG (1000 UT) CAPS, Take 25 mcg by mouth daily, Disp: 90 capsule, Rfl: 3     metFORMIN (GLUCOPHAGE XR) 500 MG 24 hr tablet, Take 1 tablet (500 mg) by mouth daily for 7 days, THEN 2 tablets (1,000 mg) daily for 23 days. Take with food.., Disp: 60 tablet, Rfl: 2     ALLERGIES:    Allergies   Allergen Reactions     Aripiprazole      Other reaction(s): Tremors  Restless legs and bilateral leg weakness.       Morphine And Codeine Other (See Comments)     agitation     Promethazine-Dm Nausea and Vomiting     agitation     Sulfamethoxazole-Trimethoprim Nausea and Vomiting        SOCIAL HISTORY:   Social History     Socioeconomic History     Marital status:      Spouse name: Not on file     Number of children: Not on file     Years of education: Not on file     Highest education level: Not on file   Occupational History     Not on file   Tobacco Use     Smoking status: Every Day     Current packs/day: 0.50     Average packs/day: 0.5 packs/day for 8.0 years (4.0 ttl pk-yrs)     Types: Cigarettes     Smokeless tobacco: Never   Vaping Use     Vaping status: Never Used   Substance and Sexual Activity     Alcohol use: Not Currently     Comment: 1 week ago     Drug use: Yes     Types: Marijuana     Comment: unsure when last used is in remission from drugs     Sexual activity: Yes     Partners: Male     Birth control/protection: Implant   Other Topics Concern      "Parent/sibling w/ CABG, MI or angioplasty before 65F 55M? 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/22/2023     Social Determinants of Health     Financial Resource Strain: Not on file   Food Insecurity: Not on file   Transportation Needs: Not on file   Physical Activity: Not on file   Stress: Not on file   Social Connections: Not on file   Interpersonal Safety: Low Risk  (6/7/2024)    Interpersonal Safety      Do you feel physically and emotionally safe where you currently live?: Yes      Within the past 12 months, have you been hit, slapped, kicked or otherwise physically hurt by someone?: No      Within the past 12 months, have you been humiliated or emotionally abused in other ways by your partner or ex-partner?: No   Housing Stability: Not on file        FAMILY HISTORY:   Family History   Problem Relation Age of Onset     Cancer Mother      Coronary Artery Disease Mother         bypass age 53     Hypertension Mother      Bipolar Disorder Father      Substance Abuse Father      Cancer Maternal Grandmother      Heart Disease Paternal Grandmother      Substance Abuse Paternal Grandmother      Hypertension Paternal Grandmother      Colorectal Cancer No family hx of         EXAM:Vitals: /80 (BP Location: Left arm, Patient Position: Sitting, Cuff Size: Adult Large)   Ht 1.651 m (5' 5\")   Wt 83 kg (183 lb)   BMI 30.45 kg/m    BMI= Body mass index is 30.45 kg/m .    General appearance: Patient is alert and fully cooperative with history & exam.  No sign of distress is noted during the visit.     Psychiatric: Affect is pleasant & appropriate.  Patient appears motivated to improve health.     Respiratory: Breathing is regular & unlabored while sitting.     HEENT: Hearing is intact to spoken word.  Speech is clear.  No gross evidence of visual impairment that would impact ambulation.     Vascular: DP & PT pulses are " intact & regular bilaterally.  No significant edema or varicosities noted.  CFT and skin temperature is normal to both lower extremities.     Neurologic: Lower extremity sensation is intact to light touch.  No evidence of weakness or contracture in the lower extremities.  No evidence of neuropathy.    Dermatologic: Skin is intact to both lower extremities with adequate texture, turgor and tone about the integument.  Both hallux nails are forming pincer nails quite severe.  There is pain in the medial lateral borders of both.  No bleeding or drainage.    Musculoskeletal: Patient is ambulatory without assistive device or brace.  No gross ankle deformity noted.  No foot or ankle joint effusion is noted.       ASSESSMENT:       ICD-10-CM    1. 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 Cigarette smoker  F17.210            PLAN:  Reviewed patient's chart in [facility].      10/10/2024   Discussed treatment options for her regarding appropriate debridement.  Also discussed matrixectomy of the borders or most likely the entire hallux nail.  Written instructions dispensed.  She will follow-up for matrixectomy as needed.  All questions answered      Atilio Veliz DPM      Again, thank you for allowing me to participate in the care of your patient.        Sincerely,        Atilio Veliz DPM

## 2024-10-10 NOTE — PROGRESS NOTES
HPI:  Erma Gregory is a 43 year old female who is seen in consultation at the request of self    Pt presents for eval of:   (Onset, Location, L/R, Character, Treatments, Injury if yes)    DM type 2     Ongoing pincer Left and Right toenails. Recent pedicure painful medial Right > Left great toenails.    Works doing home care.    ROS:  10 point ROS neg other than the symptoms noted above in the HPI.    Patient Active Problem List   Diagnosis    Depression with anxiety    Psychosis (H)    High cholesterol    Cigarette smoker    Opioid dependence on agonist therapy (H)    Erythema ab igne    Urge incontinence of urine    Vitamin D deficiency    Prediabetes       PAST MEDICAL HISTORY:   Past Medical History:   Diagnosis Date    Bipolar 1 disorder, depressed (H)     Combinations of drug dependence excluding opioid type drug, in remission (H)     in methadone program    Diabetes (H)     Erythema ab igne     Schizo affective schizophrenia (H)         PAST SURGICAL HISTORY:   Past Surgical History:   Procedure Laterality Date    NO HISTORY OF SURGERY          MEDICATIONS:   Current Outpatient Medications:     buprenorphine HCl-naloxone HCl (SUBOXONE) 4-1 MG per film, Place 1 Film under the tongue daily. Total daily buprenorphine dose is 28 mg. Do not start before September 22, 2024., Disp: 30 Film, Rfl: 1    buprenorphine HCl-naloxone HCl (SUBOXONE) 8-2 MG per film, Place 1 Film under the tongue 3 times daily. Total daily buprenorphine dose is 28 mg. Do not start before September 22, 2024., Disp: 90 Film, Rfl: 1    cyclobenzaprine (FLEXERIL) 10 MG tablet, Take 0.5-1 tablets (5-10 mg) by mouth 2 times daily as needed for muscle spasms, Disp: 30 tablet, Rfl: 0    etonogestrel (IMPLANON/NEXPLANON) 68 MG IMPL, 1 each by Subdermal route once, Disp: , Rfl:     nicotine (NICODERM CQ) 21 MG/24HR 24 hr patch, Place 1 patch onto the skin every 24 hours, Disp: 30 patch, Rfl: 2    QUEtiapine (SEROQUEL XR) 50 MG TB24 24 hr tablet,  Take by mouth nightly as needed for sleep, Disp: , Rfl:     risperiDONE (RISPERDAL) 4 MG tablet, Take 1 tablet (4 mg) by mouth at bedtime, Disp: 30 tablet, Rfl: 0    rosuvastatin (CRESTOR) 10 MG tablet, Take 1 tablet (10 mg) by mouth daily, Disp: 90 tablet, Rfl: 3    Vitamin D, Cholecalciferol, 25 MCG (1000 UT) CAPS, Take 25 mcg by mouth daily, Disp: 90 capsule, Rfl: 3    metFORMIN (GLUCOPHAGE XR) 500 MG 24 hr tablet, Take 1 tablet (500 mg) by mouth daily for 7 days, THEN 2 tablets (1,000 mg) daily for 23 days. Take with food.., Disp: 60 tablet, Rfl: 2     ALLERGIES:    Allergies   Allergen Reactions    Aripiprazole      Other reaction(s): Tremors  Restless legs and bilateral leg weakness.      Morphine And Codeine Other (See Comments)     agitation    Promethazine-Dm Nausea and Vomiting     agitation    Sulfamethoxazole-Trimethoprim Nausea and Vomiting        SOCIAL HISTORY:   Social History     Socioeconomic History    Marital status:      Spouse name: Not on file    Number of children: Not on file    Years of education: Not on file    Highest education level: Not on file   Occupational History    Not on file   Tobacco Use    Smoking status: Every Day     Current packs/day: 0.50     Average packs/day: 0.5 packs/day for 8.0 years (4.0 ttl pk-yrs)     Types: Cigarettes    Smokeless tobacco: Never   Vaping Use    Vaping status: Never Used   Substance and Sexual Activity    Alcohol use: Not Currently     Comment: 1 week ago    Drug use: Yes     Types: Marijuana     Comment: unsure when last used is in remission from drugs    Sexual activity: Yes     Partners: Male     Birth control/protection: Implant   Other Topics Concern    Parent/sibling w/ CABG, MI or angioplasty before 65F 55M? 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/22/2023     Social Determinants of Health     Financial Resource Strain: Not on file   Food  "Insecurity: Not on file   Transportation Needs: Not on file   Physical Activity: Not on file   Stress: Not on file   Social Connections: Not on file   Interpersonal Safety: Low Risk  (6/7/2024)    Interpersonal Safety     Do you feel physically and emotionally safe where you currently live?: Yes     Within the past 12 months, have you been hit, slapped, kicked or otherwise physically hurt by someone?: No     Within the past 12 months, have you been humiliated or emotionally abused in other ways by your partner or ex-partner?: No   Housing Stability: Not on file        FAMILY HISTORY:   Family History   Problem Relation Age of Onset    Cancer Mother     Coronary Artery Disease Mother         bypass age 53    Hypertension Mother     Bipolar Disorder Father     Substance Abuse Father     Cancer Maternal Grandmother     Heart Disease Paternal Grandmother     Substance Abuse Paternal Grandmother     Hypertension Paternal Grandmother     Colorectal Cancer No family hx of         EXAM:Vitals: /80 (BP Location: Left arm, Patient Position: Sitting, Cuff Size: Adult Large)   Ht 1.651 m (5' 5\")   Wt 83 kg (183 lb)   BMI 30.45 kg/m    BMI= Body mass index is 30.45 kg/m .    General appearance: Patient is alert and fully cooperative with history & exam.  No sign of distress is noted during the visit.     Psychiatric: Affect is pleasant & appropriate.  Patient appears motivated to improve health.     Respiratory: Breathing is regular & unlabored while sitting.     HEENT: Hearing is intact to spoken word.  Speech is clear.  No gross evidence of visual impairment that would impact ambulation.     Vascular: DP & PT pulses are intact & regular bilaterally.  No significant edema or varicosities noted.  CFT and skin temperature is normal to both lower extremities.     Neurologic: Lower extremity sensation is intact to light touch.  No evidence of weakness or contracture in the lower extremities.  No evidence of " neuropathy.    Dermatologic: Skin is intact to both lower extremities with adequate texture, turgor and tone about the integument.  Both hallux nails are forming pincer nails quite severe.  There is pain in the medial lateral borders of both.  No bleeding or drainage.    Musculoskeletal: Patient is ambulatory without assistive device or brace.  No gross ankle deformity noted.  No foot or ankle joint effusion is noted.       ASSESSMENT:       ICD-10-CM    1. 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 Cigarette smoker  F17.210            PLAN:  Reviewed patient's chart in [facility].      10/10/2024   Discussed treatment options for her regarding appropriate debridement.  Also discussed matrixectomy of the borders or most likely the entire hallux nail.  Written instructions dispensed.  She will follow-up for matrixectomy as needed.  All questions answered      Atilio Veliz DPM

## 2024-10-30 ENCOUNTER — VIRTUAL VISIT (OUTPATIENT)
Dept: FAMILY MEDICINE | Facility: CLINIC | Age: 44
End: 2024-10-30
Attending: FAMILY MEDICINE
Payer: COMMERCIAL

## 2024-10-30 DIAGNOSIS — F11.20 OPIOID DEPENDENCE ON AGONIST THERAPY (H): ICD-10-CM

## 2024-10-30 DIAGNOSIS — F25.0 SCHIZOAFFECTIVE DISORDER, BIPOLAR TYPE (H): ICD-10-CM

## 2024-10-30 DIAGNOSIS — R73.03 PREDIABETES: ICD-10-CM

## 2024-10-30 DIAGNOSIS — F17.210 CIGARETTE SMOKER: Primary | ICD-10-CM

## 2024-10-30 DIAGNOSIS — F12.90 EPISODIC CANNABIS USE: ICD-10-CM

## 2024-10-30 PROCEDURE — 99214 OFFICE O/P EST MOD 30 MIN: CPT | Mod: 95 | Performed by: FAMILY MEDICINE

## 2024-10-30 RX ORDER — METFORMIN HYDROCHLORIDE 500 MG/1
TABLET, EXTENDED RELEASE ORAL
Qty: 60 TABLET | Refills: 2 | Status: SHIPPED | OUTPATIENT
Start: 2024-10-30 | End: 2024-11-29

## 2024-10-30 RX ORDER — VARENICLINE TARTRATE 1 MG/1
1 TABLET, FILM COATED ORAL 2 TIMES DAILY
Qty: 60 TABLET | Refills: 0 | Status: SHIPPED | OUTPATIENT
Start: 2024-11-27

## 2024-10-30 RX ORDER — BUPRENORPHINE AND NALOXONE 8; 2 MG/1; MG/1
1 FILM, SOLUBLE BUCCAL; SUBLINGUAL 3 TIMES DAILY
Qty: 90 FILM | Refills: 1 | Status: SHIPPED | OUTPATIENT
Start: 2024-10-30

## 2024-10-30 RX ORDER — BUPRENORPHINE AND NALOXONE 4; 1 MG/1; MG/1
1 FILM, SOLUBLE BUCCAL; SUBLINGUAL DAILY
Qty: 30 FILM | Refills: 1 | Status: SHIPPED | OUTPATIENT
Start: 2024-10-30

## 2024-10-30 RX ORDER — VARENICLINE TARTRATE 0.5 (11)-1
KIT ORAL
Qty: 53 TABLET | Refills: 0 | Status: SHIPPED | OUTPATIENT
Start: 2024-10-30

## 2024-10-30 NOTE — PROGRESS NOTES
Assessment/Plan - Phone Visit.    Cigarette smoker  About 1 ppd.  -start Chantix. Risk of nausea and vivid dreams reviewed. Encouraged pt to reach out if psychosis symptoms recur after starting Chantix    Opioid dependence on agonist therapy (H)  Background: Opiate pain pills around 1154-3383, initially prescribed for foot, last pain pills 6/2017. Started Suboxone, then returned to use following rapid taper in winter 9046-3708. Hx MJ use and rare cocaine, meth, benzo use. Denies hx of IV drug use. Treatment programs - residential at [facility] 1-2/2017. Methadone - 2008-9/2016 at [facility] (logistics, difficulty tapering). Suboxone - 2008 w/ Dr. Marcano (office closed),  9/2016-.    Sustained remission. Considering buprenorphine taper.  -continue buprenorphine 28 mg/d. Ok to try 24 mg/d if feeling ready  -[place] prescription monitoring database reviewed today    Episodic cannabis use  Again reviewed w/ Erma that cannabis may increase risk of recurrence of previous psychosis symptoms.    Headache. Brief nature of episodes suggest muscle tension.  -Erma to reach out if increased frequency of episodes. Consider imaging for aneurysm    Next visit in 2 months for OUD.      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    She was prescribed metformin for prediabetes at last visit.  She has difficulty with swallowing the tablets.    Energy level has improved overall.    No medication changes since last visit.  Continues buprenorphine.  Occasional constipation, which responds to MiraLAX.  Takes 28 mg most days.  Sometimes only takes 24 mg.  Denies recent illicit drug use.  Continues to use cannabis.  She feels that this helps her to  relax.    History of psychosis.  Sometimes sees silhouettes or feels as if another being is present.  Denies hearing voices.  Describes her symptoms as a spiritual feeling.  They do not bother her.  She does not feel that recent cannabis use has intensified her symptoms.    Intense head pain.  Onset 2 months ago.  Episodes last for up to a minutes.  They occur only once every few weeks.  Location varies.  During episodes, the pain is very intense.  Episodes have not occurred with sleep.  They do not seem related to exertion, sex or tobacco use.  No recent head trauma.  No family history of cerebral aneurysm.    Patient is interested in restarting Chantix.  She tolerated this in the past.  She is smoking about 1 pack/day.    Exam  Affect normal.    Start time of visit 8:41 AM.  End time 9:09 PM.  During visit, both patient and provider were located in [place].

## 2024-10-30 NOTE — ASSESSMENT & PLAN NOTE
About 1 ppd.  -start Chantix. Risk of nausea and vivid dreams reviewed. Encouraged pt to reach out if psychosis symptoms recur after starting Chantix

## 2024-10-31 NOTE — ASSESSMENT & PLAN NOTE
Background: Opiate pain pills around 2387-1601, initially prescribed for foot, last pain pills 6/2017. Started Suboxone, then returned to use following rapid taper in winter 1348-7726. Hx MJ use and rare cocaine, meth, benzo use. Denies hx of IV drug use. Treatment programs - residential at [facility]) 1-2/2017. Methadone - 2008-9/2016 at [facility] (logistics, difficulty tapering). Suboxone - 2008 w/ Dr. Marcano (office closed),  9/2016-.    Sustained remission. Considering buprenorphine taper.  -continue buprenorphine 28 mg/d. Ok to try 24 mg/d if feeling ready  -[place] prescription monitoring database reviewed today

## 2024-10-31 NOTE — ASSESSMENT & PLAN NOTE
Again reviewed krunal/ Erma that cannabis may increase risk of recurrence of previous psychosis symptoms.

## 2024-11-05 PROBLEM — F25.0 SCHIZOAFFECTIVE DISORDER, BIPOLAR TYPE (H): Status: RESOLVED | Noted: 2024-11-05 | Resolved: 2024-11-05

## 2024-11-05 PROBLEM — R32 URINE INCONTINENCE: Status: ACTIVE | Noted: 2023-12-22

## 2024-11-05 PROBLEM — F25.0 SCHIZOAFFECTIVE DISORDER, BIPOLAR TYPE (H): Status: ACTIVE | Noted: 2024-11-05

## 2024-11-05 PROBLEM — F25.0 SCHIZOAFFECTIVE DISORDER, BIPOLAR TYPE (H): Status: ACTIVE | Noted: 2019-05-17

## 2025-02-03 ENCOUNTER — MYC MEDICAL ADVICE (OUTPATIENT)
Dept: FAMILY MEDICINE | Facility: CLINIC | Age: 45
End: 2025-02-03
Payer: COMMERCIAL

## 2025-02-03 DIAGNOSIS — F11.20 OPIOID DEPENDENCE ON AGONIST THERAPY (H): ICD-10-CM

## 2025-02-03 RX ORDER — BUPRENORPHINE AND NALOXONE 8; 2 MG/1; MG/1
1 FILM, SOLUBLE BUCCAL; SUBLINGUAL 3 TIMES DAILY
Qty: 90 FILM | Refills: 0 | Status: CANCELLED | OUTPATIENT
Start: 2025-02-03

## 2025-02-28 ENCOUNTER — OFFICE VISIT (OUTPATIENT)
Dept: FAMILY MEDICINE | Facility: CLINIC | Age: 45
End: 2025-02-28
Attending: FAMILY MEDICINE
Payer: COMMERCIAL

## 2025-02-28 VITALS
HEIGHT: 65 IN | BODY MASS INDEX: 30.41 KG/M2 | OXYGEN SATURATION: 99 % | DIASTOLIC BLOOD PRESSURE: 73 MMHG | SYSTOLIC BLOOD PRESSURE: 119 MMHG | TEMPERATURE: 97.3 F | HEART RATE: 83 BPM | RESPIRATION RATE: 26 BRPM | WEIGHT: 182.5 LBS

## 2025-02-28 DIAGNOSIS — R40.0 SOMNOLENCE: ICD-10-CM

## 2025-02-28 DIAGNOSIS — F25.0 SCHIZOAFFECTIVE DISORDER, BIPOLAR TYPE (H): ICD-10-CM

## 2025-02-28 DIAGNOSIS — E55.9 VITAMIN D DEFICIENCY: ICD-10-CM

## 2025-02-28 DIAGNOSIS — R29.818 SUSPECTED SLEEP APNEA: ICD-10-CM

## 2025-02-28 DIAGNOSIS — F11.20 OPIOID DEPENDENCE ON AGONIST THERAPY (H): ICD-10-CM

## 2025-02-28 DIAGNOSIS — R73.03 PREDIABETES: ICD-10-CM

## 2025-02-28 DIAGNOSIS — Z00.00 VISIT FOR PREVENTIVE HEALTH EXAMINATION: Primary | ICD-10-CM

## 2025-02-28 PROCEDURE — 90472 IMMUNIZATION ADMIN EACH ADD: CPT | Performed by: FAMILY MEDICINE

## 2025-02-28 PROCEDURE — 99396 PREV VISIT EST AGE 40-64: CPT | Mod: 25 | Performed by: FAMILY MEDICINE

## 2025-02-28 PROCEDURE — 3078F DIAST BP <80 MM HG: CPT | Performed by: FAMILY MEDICINE

## 2025-02-28 PROCEDURE — 90656 IIV3 VACC NO PRSV 0.5 ML IM: CPT | Performed by: FAMILY MEDICINE

## 2025-02-28 PROCEDURE — 3074F SYST BP LT 130 MM HG: CPT | Performed by: FAMILY MEDICINE

## 2025-02-28 PROCEDURE — 99214 OFFICE O/P EST MOD 30 MIN: CPT | Mod: 25 | Performed by: FAMILY MEDICINE

## 2025-02-28 PROCEDURE — 80306 DRUG TEST PRSMV INSTRMNT: CPT | Performed by: FAMILY MEDICINE

## 2025-02-28 PROCEDURE — 90677 PCV20 VACCINE IM: CPT | Performed by: FAMILY MEDICINE

## 2025-02-28 PROCEDURE — 90471 IMMUNIZATION ADMIN: CPT | Performed by: FAMILY MEDICINE

## 2025-02-28 RX ORDER — BUPRENORPHINE AND NALOXONE 8; 2 MG/1; MG/1
1 FILM, SOLUBLE BUCCAL; SUBLINGUAL 3 TIMES DAILY
Qty: 90 FILM | Refills: 1 | Status: SHIPPED | OUTPATIENT
Start: 2025-02-28

## 2025-02-28 RX ORDER — METFORMIN HYDROCHLORIDE 500 MG/1
1000 TABLET, EXTENDED RELEASE ORAL DAILY
Qty: 180 TABLET | Refills: 3 | Status: SHIPPED | OUTPATIENT
Start: 2025-02-28

## 2025-02-28 RX ORDER — BUPRENORPHINE AND NALOXONE 4; 1 MG/1; MG/1
1 FILM, SOLUBLE BUCCAL; SUBLINGUAL DAILY
Qty: 30 FILM | Refills: 1 | Status: SHIPPED | OUTPATIENT
Start: 2025-02-28

## 2025-02-28 SDOH — HEALTH STABILITY: PHYSICAL HEALTH: ON AVERAGE, HOW MANY DAYS PER WEEK DO YOU ENGAGE IN MODERATE TO STRENUOUS EXERCISE (LIKE A BRISK WALK)?: 3 DAYS

## 2025-02-28 ASSESSMENT — PATIENT HEALTH QUESTIONNAIRE - PHQ9
SUM OF ALL RESPONSES TO PHQ QUESTIONS 1-9: 12
10. IF YOU CHECKED OFF ANY PROBLEMS, HOW DIFFICULT HAVE THESE PROBLEMS MADE IT FOR YOU TO DO YOUR WORK, TAKE CARE OF THINGS AT HOME, OR GET ALONG WITH OTHER PEOPLE: SOMEWHAT DIFFICULT
SUM OF ALL RESPONSES TO PHQ QUESTIONS 1-9: 12

## 2025-02-28 ASSESSMENT — SOCIAL DETERMINANTS OF HEALTH (SDOH): HOW OFTEN DO YOU GET TOGETHER WITH FRIENDS OR RELATIVES?: ONCE A WEEK

## 2025-02-28 NOTE — PROGRESS NOTES
Preventive Care Visit  [facility] INTEGRATED PRIMARY CARE  Vicente Vaughn MD, Family Medicine  Feb 28, 2025  {Provider  Link to SmartSet :750540}    {PROVIDER CHARTING PREFERENCE:879349}    Fanny Ritchie is a 44 year old, presenting for the following:  Physical (NEXPLONON REMOVAL )        2/28/2025     3:08 PM   Additional Questions   Roomed by Joana MAYO   Accompanied by self          HPI  ***   {MA/LPN/RN Pre-Provider Visit Orders- hCG/UA/Strep (Optional):241376}  {SUPERLIST (Optional):099086}  {additonal problems for provider to add (Optional):873137}  Advance Care Planning  Patient does not have a Health Care Directive: {ADVANCE_DIRECTIVE_STATUS:218307}      2/28/2025   General Health   How would you rate your overall physical health? (!) FAIR   Feel stress (tense, anxious, or unable to sleep) To some extent   (!) STRESS CONCERN      2/28/2025   Nutrition   Three or more servings of calcium each day? (!) NO   Diet: Regular (no restrictions)    Breakfast skipped    I don't know   How many servings of fruit and vegetables per day? (!) 0-1   How many sweetened beverages each day? (!) 3       Multiple values from one day are sorted in reverse-chronological order         2/28/2025   Exercise   Days per week of moderate/strenous exercise 3 days         2/28/2025   Social Factors   Frequency of gathering with friends or relatives Once a week   Worry food won't last until get money to buy more Patient declined   Food not last or not have enough money for food? No   Do you have housing? (Housing is defined as stable permanent housing and does not include staying ouside in a car, in a tent, in an abandoned building, in an overnight shelter, or couch-surfing.) Yes   Are you worried about losing your housing? No   Lack of transportation? No   Unable to get utilities (heat,electricity)? No         2/28/2025   Dental   Dentist two times every year? (!) DECLINE          Today's PHQ-9 Score:        2/28/2025     2:47 PM   PHQ-9 SCORE   PHQ-9 Total Score MyChart 12 (Moderate depression)   PHQ-9 Total Score 12        Patient-reported         2/28/2025   Substance Use   Alcohol more than 3/day or more than 7/wk No   Do you use any other substances recreationally? (!) CANNABIS PRODUCTS     Social History     Tobacco Use    Smoking status: Every Day     Current packs/day: 0.50     Average packs/day: 0.5 packs/day for 8.0 years (4.0 ttl pk-yrs)     Types: Cigarettes    Smokeless tobacco: Never   Vaping Use    Vaping status: Never Used   Substance Use Topics    Alcohol use: Not Currently     Comment: 1 week ago    Drug use: Yes     Types: Marijuana     Comment: unsure when last used is in remission from drugs     {Provider  If there are gaps in the social history shown above, please follow the link to update and then refresh the note Link to Social and Substance History :348219}     {Mammogram Decision Support (Optional):637577}        2/28/2025   STI Screening   New sexual partner(s) since last STI/HIV test? No     History of abnormal Pap smear: { :723441}        2/5/2014    12:00 AM 3/13/2008    12:00 AM   PAP / HPV   PAP (Historical) NIL  NIL      ASCVD Risk   The 10-year ASCVD risk score (Juan MARTINES, et al., 2019) is: 5%    Values used to calculate the score:      Age: 44 years      Sex: Female      Is Non- : No      Diabetic: No      Tobacco smoker: Yes      Systolic Blood Pressure: 119 mmHg      Is BP treated: No      HDL Cholesterol: 34 mg/dL      Total Cholesterol: 197 mg/dL        2/28/2025   Contraception/Family Planning   Questions about contraception or family planning (!) YES ***     {Provider  REQUIRED FOR AWV Use the storyboard to review patient history, after sections have been marked as reviewed, refresh note to capture documentation:145372}   Reviewed and updated as needed this visit by Provider                    {HISTORY OPTIONS (Optional):765111}    {ROS  "Picklists (Optional):679707}     Objective    Exam  /73 (BP Location: Right arm, Patient Position: Sitting, Cuff Size: Adult Regular)   Pulse 83   Temp 97.3  F (36.3  C) (Temporal)   Resp 26   Ht 1.64 m (5' 4.57\")   Wt 82.8 kg (182 lb 8 oz)   SpO2 99%   BMI 30.78 kg/m     Estimated body mass index is 30.78 kg/m  as calculated from the following:    Height as of this encounter: 1.64 m (5' 4.57\").    Weight as of this encounter: 82.8 kg (182 lb 8 oz).    Physical Exam  {Exam Choices (Optional):699789}        Signed Electronically by: Vicente Vaughn MD  {Email feedback regarding this note to [email or web address]   :698835}  Answers submitted by the patient for this visit:  Patient Health Questionnaire (Submitted on 2/28/2025)  If you checked off any problems, how difficult have these problems made it for you to do your work, take care of things at home, or get along with other people?: Somewhat difficult  PHQ9 TOTAL SCORE: 12    "

## 2025-02-28 NOTE — PATIENT INSTRUCTIONS
Flu shot and pneumonia shot today    Try 2 tablets of metformin each day  Ok to crush metformin if needed  May cause diarrhea, but usually gets better within a couple weeks    Blood tests for fatigue at 1:15 on Thursday in [place]. No need to fast for tests.    It is common to not have a period when using Nexplanon. Up to you whether or not to continue using it    Call to schedule sleep study - [phone]    Let's meet again in 2 months. Schedule through [employer]

## 2025-02-28 NOTE — PROGRESS NOTES
Assessment/Plan    Problem   Visit for Preventive Health Examination    Contraception - Nexplanon  COVID vaccine - declines  See below for additional preventive orders     Somnolence    3/4/2025  Present for at least a year.  Daily.  Tired with small tasks.  Perhaps some leg weakness.  No recent dyspnea, chest pain, palpitations or presyncope.  Denies feeling depressed.  Snores and wakes frequently during the night, but no dyspnea or witnessed apnea.  Labs in July 2024 notable for vitamin D deficiency.  Patient now taking vitamin D supplement.  -Sleep study  -Labs     Prediabetes    3/4/2025  Gags when trying to take metformin tablet.  Has been crushing the medication.  -Increase metformin from 500 to 1000 mg daily.  Okay to crush tablets, but reviewed that this may increase risk of diarrhea     Opioid Dependence On Agonist Therapy (H)    -Opiate pain pills around 1110-5858, initially prescribed for foot, last pain pills 6/2017  -Hx MJ use and rare cocaine, meth, benzo use  -Denies hx of IV drug use  -Treatment programs - residential at [facility] 1-2/2017  -Methadone - 2008-9/2016 at [facility] (logistics, difficulty tapering)  -Suboxone - 2008 w/ Dr. Marcano (office closed), [place] 9/2016-present    3/4/2025  Happy with current buprenorphine dose.  Denies recent alcohol or illicit drug use.  Uses cannabis.  -Continue buprenorphine 28 mg daily.  [place] prescription monitoring database reviewed today     Schizoaffective Disorder, Bipolar Type (H)    -Hospitalized at [facility]  -Previously on IM risperidone.  -Abilify - effective but caused tremor  -Bupropion - unhelpful    3/4/2025  Sometimes forgets to take risperidone and other meds.  She is considering returning to injectable antipsychotic.  -Follow up with psychiatric provider at [facility] concern.  Patient considering removal.  She is anxious about some recent studies that she heard linked  "Depo to brain tumors.  No menses since Nexplanon placement.  -Reviewed with patient that many women do not experience menses when using Nexplanon.  Reviewed with patient that dose of progestin and Nexplanon is significantly lower than that and Depo and that I am not aware of any increased risk of cancer in individuals using Nexplanon.  Patient chooses to continue Nexplanon for now      Orders Placed This Encounter   Procedures    Pneumococcal 20 Valent Conjugate (Prevnar 20)    INFLUENZA VACCINE,SPLIT VIRUS,TRIVALENT,PF(FLUZONE)    Vitamin D Deficiency    TSH with free T4 reflex    CBC with platelets    Comprehensive metabolic panel (BMP + Alb, Alk Phos, ALT, AST, Total. Bili, TP)    Hemoglobin A1c    Urine Drug Screen Clinic    Adult Sleep Eval & Management  Referral     Next visit in 2 months for medication check.      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    Tremor of tongue.  Oropharynx without abnormal masses.  No cervical adenopathy.  No thyromegaly.    Upon initial auscultation, wheeze-like sounds with inspiration.  These then cleared.  No expiratory wheezes.  Heart with regular rate and rhythm, no murmurs.  "

## 2025-03-02 ENCOUNTER — HEALTH MAINTENANCE LETTER (OUTPATIENT)
Age: 45
End: 2025-03-02

## 2025-03-04 PROBLEM — F25.0 SCHIZOAFFECTIVE DISORDER, BIPOLAR TYPE (H): Status: ACTIVE | Noted: 2019-05-17

## 2025-03-04 PROBLEM — E78.5 DYSLIPIDEMIA: Status: ACTIVE | Noted: 2022-12-20

## 2025-03-04 PROBLEM — Z00.00 VISIT FOR PREVENTIVE HEALTH EXAMINATION: Status: ACTIVE | Noted: 2025-03-04

## 2025-03-04 PROBLEM — R40.0 SOMNOLENCE: Status: ACTIVE | Noted: 2025-03-04

## 2025-03-04 PROBLEM — R73.03 PREDIABETES: Status: ACTIVE | Noted: 2024-07-16

## 2025-03-17 ENCOUNTER — LAB (OUTPATIENT)
Dept: LAB | Facility: OTHER | Age: 45
End: 2025-03-17
Payer: COMMERCIAL

## 2025-03-17 ENCOUNTER — PATIENT OUTREACH (OUTPATIENT)
Dept: CARE COORDINATION | Facility: CLINIC | Age: 45
End: 2025-03-17

## 2025-03-17 DIAGNOSIS — R73.03 PREDIABETES: ICD-10-CM

## 2025-03-17 DIAGNOSIS — E55.9 VITAMIN D DEFICIENCY: ICD-10-CM

## 2025-03-17 DIAGNOSIS — Z00.00 VISIT FOR PREVENTIVE HEALTH EXAMINATION: ICD-10-CM

## 2025-03-17 LAB
ALBUMIN SERPL BCG-MCNC: 4.3 G/DL (ref 3.5–5.2)
ALP SERPL-CCNC: 108 U/L (ref 40–150)
ALT SERPL W P-5'-P-CCNC: 29 U/L (ref 0–50)
ANION GAP SERPL CALCULATED.3IONS-SCNC: 10 MMOL/L (ref 7–15)
AST SERPL W P-5'-P-CCNC: 15 U/L (ref 0–45)
BILIRUB SERPL-MCNC: 0.5 MG/DL
BUN SERPL-MCNC: 10.7 MG/DL (ref 6–20)
CALCIUM SERPL-MCNC: 9.5 MG/DL (ref 8.8–10.4)
CHLORIDE SERPL-SCNC: 106 MMOL/L (ref 98–107)
CREAT SERPL-MCNC: 0.84 MG/DL (ref 0.51–0.95)
EGFRCR SERPLBLD CKD-EPI 2021: 86 ML/MIN/1.73M2
ERYTHROCYTE [DISTWIDTH] IN BLOOD BY AUTOMATED COUNT: 12.8 % (ref 10–15)
EST. AVERAGE GLUCOSE BLD GHB EST-MCNC: 120 MG/DL
GLUCOSE SERPL-MCNC: 139 MG/DL (ref 70–99)
HBA1C MFR BLD: 5.8 % (ref 0–5.6)
HCO3 SERPL-SCNC: 24 MMOL/L (ref 22–29)
HCT VFR BLD AUTO: 42.7 % (ref 35–47)
HGB BLD-MCNC: 14.5 G/DL (ref 11.7–15.7)
MCH RBC QN AUTO: 30.1 PG (ref 26.5–33)
MCHC RBC AUTO-ENTMCNC: 34 G/DL (ref 31.5–36.5)
MCV RBC AUTO: 89 FL (ref 78–100)
PLATELET # BLD AUTO: 275 10E3/UL (ref 150–450)
POTASSIUM SERPL-SCNC: 3.8 MMOL/L (ref 3.4–5.3)
PROT SERPL-MCNC: 7.2 G/DL (ref 6.4–8.3)
RBC # BLD AUTO: 4.81 10E6/UL (ref 3.8–5.2)
SODIUM SERPL-SCNC: 140 MMOL/L (ref 135–145)
TSH SERPL DL<=0.005 MIU/L-ACNC: 0.57 UIU/ML (ref 0.3–4.2)
WBC # BLD AUTO: 10.6 10E3/UL (ref 4–11)

## 2025-03-17 PROCEDURE — 83036 HEMOGLOBIN GLYCOSYLATED A1C: CPT

## 2025-03-17 PROCEDURE — 80053 COMPREHEN METABOLIC PANEL: CPT

## 2025-03-17 PROCEDURE — 82306 VITAMIN D 25 HYDROXY: CPT

## 2025-03-17 PROCEDURE — 36415 COLL VENOUS BLD VENIPUNCTURE: CPT

## 2025-03-17 PROCEDURE — 84443 ASSAY THYROID STIM HORMONE: CPT

## 2025-03-17 PROCEDURE — 85027 COMPLETE CBC AUTOMATED: CPT

## 2025-03-18 LAB — VIT D+METAB SERPL-MCNC: 21 NG/ML (ref 20–50)

## 2025-03-19 PROBLEM — E55.9 VITAMIN D DEFICIENCY: Status: ACTIVE | Noted: 2024-07-16

## 2025-03-19 RX ORDER — CHOLECALCIFEROL (VITAMIN D3) 50 MCG
1 TABLET ORAL DAILY
Qty: 90 TABLET | Refills: 3 | Status: SHIPPED | OUTPATIENT
Start: 2025-03-19

## 2025-06-02 ENCOUNTER — VIRTUAL VISIT (OUTPATIENT)
Dept: FAMILY MEDICINE | Facility: CLINIC | Age: 45
End: 2025-06-02
Attending: FAMILY MEDICINE
Payer: COMMERCIAL

## 2025-06-02 DIAGNOSIS — F11.20 OPIOID DEPENDENCE ON AGONIST THERAPY (H): Primary | ICD-10-CM

## 2025-06-02 DIAGNOSIS — R07.89 ATYPICAL CHEST PAIN: ICD-10-CM

## 2025-06-02 DIAGNOSIS — F25.0 SCHIZOAFFECTIVE DISORDER, BIPOLAR TYPE (H): ICD-10-CM

## 2025-06-02 PROCEDURE — 98006 SYNCH AUDIO-VIDEO EST MOD 30: CPT | Performed by: FAMILY MEDICINE

## 2025-06-02 RX ORDER — BUPRENORPHINE AND NALOXONE 8; 2 MG/1; MG/1
1 FILM, SOLUBLE BUCCAL; SUBLINGUAL 3 TIMES DAILY
Qty: 90 FILM | Refills: 0 | Status: SHIPPED | OUTPATIENT
Start: 2025-06-02

## 2025-06-02 RX ORDER — ARIPIPRAZOLE 10 MG/1
10 TABLET ORAL DAILY
COMMUNITY

## 2025-06-02 NOTE — PROGRESS NOTES
"Assessment/Plan    Schizoaffective disorder.  Patient stopped taking risperidone around April.  She began to have some uncomfortable thoughts.  She met with her psychiatrist at [facility].  She is now taking a higher dose of Seroquel (usually at 100 mg at night) and Abilify.  She reports that her mental health has improved.    Opioid use disorder.  Patient sent me a [employer] message in early May asking to decrease her dose.  She feels that Suboxone is a \"crutch\" and she would like to taper off it.  After receiving her [employer] message, I encouraged her to try 24 mg daily rather than 28 mg daily.  She has been tolerating this dose, though she occasionally takes an extra 4 mg of buprenorphine.  She denies recent using thoughts.  She denies alcohol or illicit drug use since last visit.  She has reduced her cannabis intake following her mental health decompensation, though she still uses it occasionally.  While I respect patient's desire to decrease her buprenorphine dose, given her recent mental health decompensation, we agreed to continue buprenorphine at 24 mg daily, with the option to take 28 mg if needed.  [place] prescription monitoring database reviewed today.    Chest pain.  This started a few weeks ago.  Intermittent.  Often occurs when laying down at night.  On 1 occasion, it was accompanied by dyspnea, but patient has not appreciated dyspnea with most chest pain episodes.  No recent injury to the area.  Patient wonders if pain is secondary to rosuvastatin, though her dose was last increased more than 9 months ago.  She does note some recent indigestion, so I suspect acid reflux may be the cause.  She will start daily omeprazole; I encouraged her to take this for at least a week.  If her symptoms do not improve, she should be evaluated in person, such as at urgent care.    Next visit in 1 month for medication check.    No orders of the defined types were placed in this encounter.        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    Wt Readings from Last 2 Encounters:   02/28/25 82.8 kg (182 lb 8 oz)   10/10/24 83 kg (183 lb)     Affect normal.    Telehealth platform: [employer] Reason for audio-only visit: n/a.  Location of patient: home. Location of provider: office.  Start time of conversation: 5:36 PM. End time of conversation: 5:58 PM.

## 2025-06-30 ENCOUNTER — VIRTUAL VISIT (OUTPATIENT)
Dept: FAMILY MEDICINE | Facility: CLINIC | Age: 45
End: 2025-06-30
Payer: COMMERCIAL

## 2025-06-30 DIAGNOSIS — F11.20 OPIOID DEPENDENCE ON AGONIST THERAPY (H): Primary | ICD-10-CM

## 2025-06-30 DIAGNOSIS — E78.5 DYSLIPIDEMIA: ICD-10-CM

## 2025-06-30 DIAGNOSIS — R07.89 ATYPICAL CHEST PAIN: ICD-10-CM

## 2025-06-30 DIAGNOSIS — F17.210 CIGARETTE SMOKER: ICD-10-CM

## 2025-06-30 PROCEDURE — 98006 SYNCH AUDIO-VIDEO EST MOD 30: CPT | Performed by: FAMILY MEDICINE

## 2025-06-30 RX ORDER — VARENICLINE TARTRATE 1 MG/1
1 TABLET, FILM COATED ORAL 2 TIMES DAILY
Qty: 60 TABLET | Refills: 5 | Status: SHIPPED | OUTPATIENT
Start: 2025-07-28 | End: 2025-07-03

## 2025-06-30 RX ORDER — BUPRENORPHINE AND NALOXONE 8; 2 MG/1; MG/1
1 FILM, SOLUBLE BUCCAL; SUBLINGUAL 3 TIMES DAILY
Qty: 90 FILM | Refills: 1 | Status: SHIPPED | OUTPATIENT
Start: 2025-06-30 | End: 2025-07-01

## 2025-06-30 RX ORDER — VARENICLINE TARTRATE 0.5 (11)-1
KIT ORAL
Qty: 53 TABLET | Refills: 0 | Status: SHIPPED | OUTPATIENT
Start: 2025-06-30

## 2025-06-30 RX ORDER — BUPRENORPHINE AND NALOXONE 4; 1 MG/1; MG/1
1 FILM, SOLUBLE BUCCAL; SUBLINGUAL DAILY
Qty: 30 FILM | Refills: 1 | Status: SHIPPED | OUTPATIENT
Start: 2025-06-30 | End: 2025-07-01

## 2025-06-30 RX ORDER — ROSUVASTATIN CALCIUM 10 MG/1
10 TABLET, COATED ORAL DAILY
Qty: 90 TABLET | Refills: 3 | Status: SHIPPED | OUTPATIENT
Start: 2025-06-30

## 2025-06-30 NOTE — PROGRESS NOTES
Assessment/Plan    Opioid use disorder.  At last visit, patient felt strongly that she wanted to start buprenorphine taper.  We decreased buprenorphine from 28 mg to 24 mg daily, with the option to take another 4 mg daily if needed.  Patient has felt the need to take the additional 4 mg about 25% of the time.  She takes it on days that she is experiencing cravings.  Stress is a trigger for cravings, but patient is not able to identify any other triggers.  She denies alcohol or illicit drug use since last visit.  Due to increased cravings, will increase buprenorphine from 24 to 26 mg daily.  Patient will still have the option to take 28 mg daily if needed.  [place] prescription monitoring database reviewed today.    Cannabis use.  Multiple times per week.  Patient does not feel that this is a problem.    Mental health.  She experienced a decompensation a few months ago, but patient reports that mental health has been good overall in the past few weeks.  Patient has difficulty tolerating daily Seroquel use, so she only takes it as needed.  She continues Abilify.  Patient has follow-up appointment with psychiatrist tomorrow.    Nonexertional chest pain.  At last visit, I suspected acid reflux.  Patient took omeprazole daily for 2 weeks.  Her symptoms have significantly improved.  She denies recent chest pain with exertion.  Since last visit, no dyspnea.    Dyslipidemia.  Patient wonders if chest pain is secondary to rosuvastatin.  Her mother experienced statin intolerance.  We reviewed that rosuvastatin does not typically cause chest pain specifically, but that it could cause myalgias affecting this part of the body.  I offered rosuvastatin dose decrease, but patient elected to continue same dose.    Cigarette smoker.  Chantix was prescribed last October.  Patient thinks that she took it for about 3 weeks.  She did not appreciate any benefit.  She does not recall adverse effects.  We reviewed that most folks require  at least 2 months of Chantix in order to quit smoking.  We agreed to restart Chantix.  We reviewed risk of nausea and vivid dreams.  I encouraged patient to reach out if she feels that Chantix is causing adverse mental health effects.  Patient also plans to exercise more frequently.    Next visit in 2 months for medication check.    No orders of the defined types were placed in this encounter.        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    Wt Readings from Last 2 Encounters:   02/28/25 82.8 kg (182 lb 8 oz)   10/10/24 83 kg (183 lb)     Affect normal.    Telehealth platform: [employer]. Reason for audio-only visit: n/a.  Location of patient: home. Location of provider: office.  Start time of conversation: 5:35 PM. End time of conversation: 5:59 PM.

## 2025-07-01 ENCOUNTER — MYC MEDICAL ADVICE (OUTPATIENT)
Dept: FAMILY MEDICINE | Facility: CLINIC | Age: 45
End: 2025-07-01
Payer: COMMERCIAL

## 2025-07-01 DIAGNOSIS — F11.20 OPIOID DEPENDENCE ON AGONIST THERAPY (H): ICD-10-CM

## 2025-07-01 RX ORDER — BUPRENORPHINE AND NALOXONE 4; 1 MG/1; MG/1
1 FILM, SOLUBLE BUCCAL; SUBLINGUAL DAILY
Qty: 21 FILM | Refills: 2 | Status: SHIPPED | OUTPATIENT
Start: 2025-07-01

## 2025-07-01 RX ORDER — BUPRENORPHINE AND NALOXONE 8; 2 MG/1; MG/1
1 FILM, SOLUBLE BUCCAL; SUBLINGUAL 3 TIMES DAILY
Qty: 63 FILM | Refills: 2 | Status: SHIPPED | OUTPATIENT
Start: 2025-07-01

## 2025-07-01 NOTE — TELEPHONE ENCOUNTER
[facility] pharmacy calling to see if they should take off 1 of the refills from today's rx because pt already picked up one SUBOXONE 8-2 M day supply yesterday - before the new script came through.     Please advise  Okay to call pharmacy back with verbal     Thanks   RAQUEL Tejada

## 2025-07-01 NOTE — TELEPHONE ENCOUNTER
Dr. Vaughn,    [facility] pharmacy called and asked if you could write scripts for the suboxone in 21 day increments as this how pt's insurance will cover it.   With refills to meet 60 day equivalent example:    For example please write:  - SUBOXONE 4-1MG - 21 days  + 2 refills   - SUBOXONE 8-2 M days + 2 refills    They stated that other medications could be ordered this way would be helpful, but the biggest issue is w/ the suboxone rx.    Thank you,  Chuck GARCÍA  [facility]

## 2025-07-02 DIAGNOSIS — F17.210 CIGARETTE SMOKER: ICD-10-CM

## 2025-07-02 RX ORDER — VARENICLINE TARTRATE 0.5 (11)-1
KIT ORAL
Qty: 53 TABLET | Refills: 0 | Status: CANCELLED | OUTPATIENT
Start: 2025-07-02

## 2025-07-02 NOTE — TELEPHONE ENCOUNTER
Clinic RN: Please investigate patient's chart or contact patient if the information cannot be found because the last prescription was a taper dose (not in RN protocol). We need to know what dose patient is taking. Determine the current dose, then route to provider and ask provider to update the SIG and approve or deny the prescription.    Mallory Mcintosh RN on 7/2/2025 at 11:00 AM

## 2025-07-03 RX ORDER — VARENICLINE TARTRATE 1 MG/1
1 TABLET, FILM COATED ORAL 2 TIMES DAILY
Qty: 180 TABLET | Refills: 3 | Status: SHIPPED | OUTPATIENT
Start: 2025-07-28

## 2025-07-03 NOTE — TELEPHONE ENCOUNTER
With Chantix, there is gradual dose titration over first month. I think this is usually packaged as a starter pack. Starting with 2nd month, dose is stable. I'll send 90-day prescription starting 2nd month.

## 2025-07-30 ENCOUNTER — MYC MEDICAL ADVICE (OUTPATIENT)
Dept: FAMILY MEDICINE | Facility: CLINIC | Age: 45
End: 2025-07-30
Payer: COMMERCIAL